# Patient Record
Sex: MALE | Race: WHITE | Employment: FULL TIME | URBAN - METROPOLITAN AREA
[De-identification: names, ages, dates, MRNs, and addresses within clinical notes are randomized per-mention and may not be internally consistent; named-entity substitution may affect disease eponyms.]

---

## 2017-01-04 ENCOUNTER — OFFICE VISIT (OUTPATIENT)
Dept: FAMILY MEDICINE CLINIC | Age: 62
End: 2017-01-04

## 2017-01-04 VITALS
RESPIRATION RATE: 16 BRPM | WEIGHT: 314 LBS | BODY MASS INDEX: 38.22 KG/M2 | TEMPERATURE: 98.1 F | DIASTOLIC BLOOD PRESSURE: 82 MMHG | SYSTOLIC BLOOD PRESSURE: 123 MMHG | HEART RATE: 74 BPM

## 2017-01-04 DIAGNOSIS — Z98.890 S/P HEART VALVE REPAIR: ICD-10-CM

## 2017-01-04 DIAGNOSIS — J06.9 VIRAL UPPER RESPIRATORY TRACT INFECTION: Primary | ICD-10-CM

## 2017-01-04 DIAGNOSIS — E78.2 MIXED HYPERLIPIDEMIA: ICD-10-CM

## 2017-01-04 PROCEDURE — 99214 OFFICE O/P EST MOD 30 MIN: CPT | Performed by: FAMILY MEDICINE

## 2017-01-04 RX ORDER — MAGNESIUM OXIDE 400 MG/1
400 TABLET ORAL
COMMUNITY

## 2017-01-04 RX ORDER — CARVEDILOL 6.25 MG/1
TABLET ORAL 2 TIMES DAILY
COMMUNITY
Start: 2016-12-26 | End: 2017-01-04 | Stop reason: SDUPTHER

## 2017-01-04 RX ORDER — CARVEDILOL 6.25 MG/1
12.5 TABLET ORAL 2 TIMES DAILY
Qty: 60 TABLET | COMMUNITY
Start: 2017-01-04

## 2017-01-04 RX ORDER — GUAIFENESIN AND DEXTROMETHORPHAN HYDROBROMIDE 600; 30 MG/1; MG/1
1 TABLET, EXTENDED RELEASE ORAL 2 TIMES DAILY
Qty: 30 TABLET | Refills: 2 | Status: SHIPPED | OUTPATIENT
Start: 2017-01-04 | End: 2017-04-07 | Stop reason: ALTCHOICE

## 2017-01-04 RX ORDER — WARFARIN SODIUM 7.5 MG/1
7.5 TABLET ORAL
COMMUNITY
End: 2018-01-31 | Stop reason: ALTCHOICE

## 2017-01-04 RX ORDER — ATORVASTATIN CALCIUM 40 MG/1
TABLET, FILM COATED ORAL
COMMUNITY
Start: 2016-12-20 | End: 2017-01-25 | Stop reason: SDUPTHER

## 2017-01-04 RX ORDER — AMIODARONE HYDROCHLORIDE 200 MG/1
TABLET ORAL
COMMUNITY
Start: 2017-01-02 | End: 2018-01-31 | Stop reason: ALTCHOICE

## 2017-01-04 RX ORDER — ASPIRIN 81 MG/1
81 TABLET, CHEWABLE ORAL
COMMUNITY
Start: 2016-11-22 | End: 2017-01-04 | Stop reason: SDUPTHER

## 2017-01-04 RX ORDER — CARVEDILOL 6.25 MG/1
6.25 TABLET ORAL
COMMUNITY
Start: 2016-11-22 | End: 2017-01-04 | Stop reason: SDUPTHER

## 2017-01-04 ASSESSMENT — PATIENT HEALTH QUESTIONNAIRE - PHQ9
1. LITTLE INTEREST OR PLEASURE IN DOING THINGS: 0
SUM OF ALL RESPONSES TO PHQ9 QUESTIONS 1 & 2: 0
2. FEELING DOWN, DEPRESSED OR HOPELESS: 0
SUM OF ALL RESPONSES TO PHQ QUESTIONS 1-9: 0

## 2017-01-04 ASSESSMENT — ENCOUNTER SYMPTOMS
ABDOMINAL DISTENTION: 0
CONSTIPATION: 0
SHORTNESS OF BREATH: 0
DIARRHEA: 0
WHEEZING: 0

## 2017-01-08 ASSESSMENT — ENCOUNTER SYMPTOMS
TROUBLE SWALLOWING: 0
COUGH: 1
PHOTOPHOBIA: 0
SORE THROAT: 0
RHINORRHEA: 1

## 2017-01-25 ENCOUNTER — OFFICE VISIT (OUTPATIENT)
Dept: PULMONOLOGY | Age: 62
End: 2017-01-25

## 2017-01-25 ENCOUNTER — TELEPHONE (OUTPATIENT)
Dept: FAMILY MEDICINE CLINIC | Age: 62
End: 2017-01-25

## 2017-01-25 VITALS
SYSTOLIC BLOOD PRESSURE: 134 MMHG | HEIGHT: 76 IN | BODY MASS INDEX: 37.63 KG/M2 | WEIGHT: 309 LBS | OXYGEN SATURATION: 97 % | HEART RATE: 69 BPM | DIASTOLIC BLOOD PRESSURE: 88 MMHG

## 2017-01-25 DIAGNOSIS — E66.9 NON MORBID OBESITY, UNSPECIFIED OBESITY TYPE: Chronic | ICD-10-CM

## 2017-01-25 DIAGNOSIS — I50.9 CONGESTIVE HEART FAILURE, UNSPECIFIED CONGESTIVE HEART FAILURE CHRONICITY, UNSPECIFIED CONGESTIVE HEART FAILURE TYPE: Chronic | ICD-10-CM

## 2017-01-25 DIAGNOSIS — R06.83 SNORING: ICD-10-CM

## 2017-01-25 DIAGNOSIS — I48.91 ATRIAL FIBRILLATION, UNSPECIFIED TYPE (HCC): Chronic | ICD-10-CM

## 2017-01-25 DIAGNOSIS — G47.10 HYPERSOMNIA: ICD-10-CM

## 2017-01-25 DIAGNOSIS — G47.61 PERIODIC LIMB MOVEMENT DISORDER (PLMD): Primary | ICD-10-CM

## 2017-01-25 PROCEDURE — 99244 OFF/OP CNSLTJ NEW/EST MOD 40: CPT | Performed by: INTERNAL MEDICINE

## 2017-01-25 RX ORDER — ATORVASTATIN CALCIUM 40 MG/1
40 TABLET, FILM COATED ORAL DAILY
Qty: 30 TABLET | Refills: 2 | Status: SHIPPED | OUTPATIENT
Start: 2017-01-25

## 2017-01-25 RX ORDER — ATORVASTATIN CALCIUM 40 MG/1
TABLET, FILM COATED ORAL
Qty: 30 TABLET | OUTPATIENT
Start: 2017-01-25

## 2017-01-25 RX ORDER — AMIODARONE HYDROCHLORIDE 200 MG/1
TABLET ORAL
Qty: 30 TABLET | OUTPATIENT
Start: 2017-01-25

## 2017-01-25 ASSESSMENT — SLEEP AND FATIGUE QUESTIONNAIRES
ESS TOTAL SCORE: 11
HOW LIKELY ARE YOU TO NOD OFF OR FALL ASLEEP WHILE SITTING AND READING: 3
HOW LIKELY ARE YOU TO NOD OFF OR FALL ASLEEP IN A CAR, WHILE STOPPED FOR A FEW MINUTES IN TRAFFIC: 0
HOW LIKELY ARE YOU TO NOD OFF OR FALL ASLEEP WHILE SITTING INACTIVE IN A PUBLIC PLACE: 1
HOW LIKELY ARE YOU TO NOD OFF OR FALL ASLEEP WHILE LYING DOWN TO REST IN THE AFTERNOON WHEN CIRCUMSTANCES PERMIT: 2
HOW LIKELY ARE YOU TO NOD OFF OR FALL ASLEEP WHILE WATCHING TV: 3
HOW LIKELY ARE YOU TO NOD OFF OR FALL ASLEEP WHILE SITTING AND TALKING TO SOMEONE: 0
HOW LIKELY ARE YOU TO NOD OFF OR FALL ASLEEP WHILE SITTING QUIETLY AFTER LUNCH WITHOUT ALCOHOL: 2
HOW LIKELY ARE YOU TO NOD OFF OR FALL ASLEEP WHEN YOU ARE A PASSENGER IN A CAR FOR AN HOUR WITHOUT A BREAK: 0
NECK CIRCUMFERENCE (INCHES): 19

## 2017-01-25 ASSESSMENT — ENCOUNTER SYMPTOMS
SHORTNESS OF BREATH: 0
NAUSEA: 0
ABDOMINAL PAIN: 0
PHOTOPHOBIA: 0
EYE PAIN: 0
STRIDOR: 0
APNEA: 1
ABDOMINAL DISTENTION: 0
SINUS PRESSURE: 0
CHEST TIGHTNESS: 0

## 2017-02-20 ENCOUNTER — TELEPHONE (OUTPATIENT)
Dept: FAMILY MEDICINE CLINIC | Age: 62
End: 2017-02-20

## 2017-02-22 ENCOUNTER — HOSPITAL ENCOUNTER (OUTPATIENT)
Dept: OTHER | Age: 62
Discharge: HOME | End: 2017-02-23
Attending: INTERNAL MEDICINE | Admitting: INTERNAL MEDICINE

## 2017-02-24 ENCOUNTER — OFFICE VISIT (OUTPATIENT)
Dept: FAMILY MEDICINE CLINIC | Age: 62
End: 2017-02-24

## 2017-02-24 VITALS
HEIGHT: 76 IN | BODY MASS INDEX: 37.99 KG/M2 | SYSTOLIC BLOOD PRESSURE: 118 MMHG | HEART RATE: 71 BPM | DIASTOLIC BLOOD PRESSURE: 80 MMHG | WEIGHT: 312 LBS | OXYGEN SATURATION: 95 %
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
## 2017-02-24 DIAGNOSIS — J18.9 HOSPITAL ACQUIRED PNA: Primary | ICD-10-CM

## 2017-02-24 PROCEDURE — 99213 OFFICE O/P EST LOW 20 MIN: CPT | Performed by: NURSE PRACTITIONER

## 2017-02-24 ASSESSMENT — ENCOUNTER SYMPTOMS
HOARSE VOICE: 1
RHINORRHEA: 0
DIFFICULTY BREATHING: 0
NAUSEA: 0
CHEST TIGHTNESS: 0
SHORTNESS OF BREATH: 0
APNEA: 1
WHEEZING: 0
SINUS PRESSURE: 0
SORE THROAT: 0
COLOR CHANGE: 0
STRIDOR: 0
SPUTUM PRODUCTION: 0
HEMOPTYSIS: 0
VOMITING: 0
COUGH: 1

## 2017-03-03 ENCOUNTER — TELEPHONE (OUTPATIENT)
Dept: FAMILY MEDICINE CLINIC | Age: 62
End: 2017-03-03

## 2017-03-03 ENCOUNTER — OFFICE VISIT (OUTPATIENT)
Dept: FAMILY MEDICINE CLINIC | Age: 62
End: 2017-03-03

## 2017-03-03 ENCOUNTER — HOSPITAL ENCOUNTER (OUTPATIENT)
Dept: GENERAL RADIOLOGY | Age: 62
Discharge: HOME | End: 2017-03-03

## 2017-03-03 VITALS
DIASTOLIC BLOOD PRESSURE: 74 MMHG | RESPIRATION RATE: 14 BRPM | WEIGHT: 312 LBS | HEART RATE: 71 BPM | TEMPERATURE: 97.3 F | BODY MASS INDEX: 37.99 KG/M2 | SYSTOLIC BLOOD PRESSURE: 108 MMHG | OXYGEN SATURATION: 94 % | HEIGHT: 76 IN

## 2017-03-03 DIAGNOSIS — Z87.01 HISTORY OF PNEUMONIA: ICD-10-CM

## 2017-03-03 DIAGNOSIS — R07.89 ACUTE CHEST WALL PAIN: Primary | ICD-10-CM

## 2017-03-03 DIAGNOSIS — R05.9 COUGH: ICD-10-CM

## 2017-03-03 DIAGNOSIS — R07.89 ACUTE CHEST WALL PAIN: ICD-10-CM

## 2017-03-03 DIAGNOSIS — Z86.79 HISTORY OF HEART FAILURE: ICD-10-CM

## 2017-03-03 LAB
BASOPHILS ABSOLUTE: 0 K/UL (ref 0–0.2)
BASOPHILS RELATIVE PERCENT: 0.4 %
EOSINOPHILS ABSOLUTE: 0.1 K/UL (ref 0–0.6)
EOSINOPHILS RELATIVE PERCENT: 1.5 %
HCT VFR BLD CALC: 41.7 % (ref 40.5–52.5)
HEMOGLOBIN: 13.6 G/DL (ref 13.5–17.5)
LYMPHOCYTES ABSOLUTE: 1.9 K/UL (ref 1–5.1)
LYMPHOCYTES RELATIVE PERCENT: 25 %
MCH RBC QN AUTO: 29.2 PG (ref 26–34)
MCHC RBC AUTO-ENTMCNC: 32.7 G/DL (ref 31–36)
MCV RBC AUTO: 89.5 FL (ref 80–100)
MONOCYTES ABSOLUTE: 0.6 K/UL (ref 0–1.3)
MONOCYTES RELATIVE PERCENT: 8.2 %
NEUTROPHILS ABSOLUTE: 5 K/UL (ref 1.7–7.7)
NEUTROPHILS RELATIVE PERCENT: 64.9 %
PDW BLD-RTO: 14.3 % (ref 12.4–15.4)
PLATELET # BLD: 227 K/UL (ref 135–450)
PMV BLD AUTO: 9.4 FL (ref 5–10.5)
PRO-BNP: 175 PG/ML (ref 0–124)
RBC # BLD: 4.66 M/UL (ref 4.2–5.9)
WBC # BLD: 7.7 K/UL (ref 4–11)

## 2017-03-03 PROCEDURE — 99213 OFFICE O/P EST LOW 20 MIN: CPT | Performed by: FAMILY MEDICINE

## 2017-03-03 RX ORDER — M-VIT,TX,IRON,MINS/CALC/FOLIC 27MG-0.4MG
1 TABLET ORAL DAILY
COMMUNITY

## 2017-03-03 RX ORDER — ASPIRIN 81 MG/1
81 TABLET, CHEWABLE ORAL DAILY
COMMUNITY
End: 2018-01-31 | Stop reason: ALTCHOICE

## 2017-03-03 RX ORDER — ASCORBIC ACID 500 MG
750 TABLET ORAL DAILY
COMMUNITY

## 2017-03-03 RX ORDER — BENZONATATE 100 MG/1
100 CAPSULE ORAL 3 TIMES DAILY PRN
Qty: 30 CAPSULE | Refills: 1 | Status: SHIPPED | OUTPATIENT
Start: 2017-03-03 | End: 2017-03-13

## 2017-03-03 ASSESSMENT — ENCOUNTER SYMPTOMS
COUGH: 1
SINUS PRESSURE: 0
NAUSEA: 0
ABDOMINAL PAIN: 0
SHORTNESS OF BREATH: 0
RHINORRHEA: 0
CONSTIPATION: 0
SORE THROAT: 0
DIARRHEA: 0
VOMITING: 0

## 2017-03-07 ENCOUNTER — OFFICE VISIT (OUTPATIENT)
Dept: FAMILY MEDICINE CLINIC | Age: 62
End: 2017-03-07

## 2017-03-07 VITALS
HEIGHT: 76 IN | RESPIRATION RATE: 17 BRPM | SYSTOLIC BLOOD PRESSURE: 141 MMHG | DIASTOLIC BLOOD PRESSURE: 83 MMHG | TEMPERATURE: 98.3 F | WEIGHT: 315 LBS | HEART RATE: 63 BPM | OXYGEN SATURATION: 96 % | BODY MASS INDEX: 38.36 KG/M2

## 2017-03-07 DIAGNOSIS — R07.89 ACUTE CHEST WALL PAIN: Primary | ICD-10-CM

## 2017-03-07 DIAGNOSIS — R05.9 COUGH: ICD-10-CM

## 2017-03-07 PROCEDURE — 99213 OFFICE O/P EST LOW 20 MIN: CPT | Performed by: NURSE PRACTITIONER

## 2017-03-07 ASSESSMENT — ENCOUNTER SYMPTOMS
SHORTNESS OF BREATH: 0
WHEEZING: 0
SORE THROAT: 0
EYE REDNESS: 0
CHEST TIGHTNESS: 0
COLOR CHANGE: 0
NAUSEA: 0
EYE ITCHING: 0
COUGH: 1
TROUBLE SWALLOWING: 0
SINUS PRESSURE: 0
ABDOMINAL PAIN: 0
CONSTIPATION: 0
DIARRHEA: 0

## 2017-03-10 ENCOUNTER — TELEPHONE (OUTPATIENT)
Dept: FAMILY MEDICINE CLINIC | Age: 62
End: 2017-03-10

## 2017-03-20 ENCOUNTER — HOSPITAL ENCOUNTER (OUTPATIENT)
Dept: SLEEP MEDICINE | Age: 62
Discharge: HOME | End: 2017-03-22
Attending: INTERNAL MEDICINE | Admitting: INTERNAL MEDICINE

## 2017-03-20 DIAGNOSIS — G47.61 PERIODIC LIMB MOVEMENT DISORDER (PLMD): ICD-10-CM

## 2017-03-20 DIAGNOSIS — R06.83 SNORING: ICD-10-CM

## 2017-03-20 DIAGNOSIS — G47.10 HYPERSOMNIA: ICD-10-CM

## 2017-03-20 PROCEDURE — 95810 POLYSOM 6/> YRS 4/> PARAM: CPT | Performed by: INTERNAL MEDICINE

## 2017-03-26 ENCOUNTER — TELEPHONE (OUTPATIENT)
Dept: FAMILY MEDICINE CLINIC | Age: 62
End: 2017-03-26

## 2017-03-28 ENCOUNTER — TELEPHONE (OUTPATIENT)
Dept: SLEEP MEDICINE | Age: 62
End: 2017-03-28

## 2017-04-07 ENCOUNTER — OFFICE VISIT (OUTPATIENT)
Dept: FAMILY MEDICINE CLINIC | Age: 62
End: 2017-04-07

## 2017-04-07 VITALS
DIASTOLIC BLOOD PRESSURE: 60 MMHG | BODY MASS INDEX: 38.36 KG/M2 | TEMPERATURE: 98.1 F | RESPIRATION RATE: 16 BRPM | SYSTOLIC BLOOD PRESSURE: 90 MMHG | OXYGEN SATURATION: 97 % | HEART RATE: 67 BPM | WEIGHT: 315 LBS | HEIGHT: 76 IN

## 2017-04-07 DIAGNOSIS — Z86.79 HISTORY OF HEART FAILURE: ICD-10-CM

## 2017-04-07 DIAGNOSIS — R05.9 COUGH: Primary | ICD-10-CM

## 2017-04-07 DIAGNOSIS — I95.1 ORTHOSTATIC HYPOTENSION: ICD-10-CM

## 2017-04-07 DIAGNOSIS — I50.9 HEART FAILURE, UNSPECIFIED HEART FAILURE CHRONICITY, UNSPECIFIED HEART FAILURE TYPE: Chronic | ICD-10-CM

## 2017-04-07 LAB — PRO-BNP: 1613 PG/ML (ref 0–124)

## 2017-04-07 PROCEDURE — 99214 OFFICE O/P EST MOD 30 MIN: CPT | Performed by: NURSE PRACTITIONER

## 2017-04-07 RX ORDER — FLUTICASONE PROPIONATE 50 MCG
2 SPRAY, SUSPENSION (ML) NASAL DAILY
Qty: 1 BOTTLE | Refills: 0 | Status: SHIPPED | OUTPATIENT
Start: 2017-04-07 | End: 2018-01-31 | Stop reason: ALTCHOICE

## 2017-04-07 RX ORDER — SPIRONOLACTONE 25 MG/1
1 TABLET ORAL
COMMUNITY

## 2017-04-07 ASSESSMENT — PATIENT HEALTH QUESTIONNAIRE - PHQ9
2. FEELING DOWN, DEPRESSED OR HOPELESS: 0
SUM OF ALL RESPONSES TO PHQ9 QUESTIONS 1 & 2: 0
1. LITTLE INTEREST OR PLEASURE IN DOING THINGS: 0
SUM OF ALL RESPONSES TO PHQ QUESTIONS 1-9: 0

## 2017-04-07 ASSESSMENT — ENCOUNTER SYMPTOMS
WHEEZING: 0
CHEST TIGHTNESS: 0
EYES NEGATIVE: 1
VOMITING: 0
SORE THROAT: 0
SHORTNESS OF BREATH: 1
COLOR CHANGE: 0
CHANGE IN BOWEL HABIT: 0
HEARTBURN: 0
COUGH: 1
HEMOPTYSIS: 0
RHINORRHEA: 0
NAUSEA: 0

## 2017-04-10 ENCOUNTER — TELEPHONE (OUTPATIENT)
Dept: FAMILY MEDICINE CLINIC | Age: 62
End: 2017-04-10

## 2017-04-11 ENCOUNTER — TELEPHONE (OUTPATIENT)
Dept: FAMILY MEDICINE CLINIC | Age: 62
End: 2017-04-11

## 2017-06-14 ENCOUNTER — OFFICE VISIT (OUTPATIENT)
Dept: PULMONOLOGY | Age: 62
End: 2017-06-14

## 2017-06-14 VITALS
BODY MASS INDEX: 36.77 KG/M2 | HEIGHT: 76 IN | OXYGEN SATURATION: 97 % | SYSTOLIC BLOOD PRESSURE: 105 MMHG | HEART RATE: 71 BPM | DIASTOLIC BLOOD PRESSURE: 70 MMHG | WEIGHT: 302 LBS

## 2017-06-14 DIAGNOSIS — I48.91 ATRIAL FIBRILLATION, UNSPECIFIED TYPE (HCC): Chronic | ICD-10-CM

## 2017-06-14 DIAGNOSIS — G47.26 SHIFTING SLEEP-WORK SCHEDULE, AFFECTING SLEEP: ICD-10-CM

## 2017-06-14 DIAGNOSIS — I50.9 CONGESTIVE HEART FAILURE, UNSPECIFIED CONGESTIVE HEART FAILURE CHRONICITY, UNSPECIFIED CONGESTIVE HEART FAILURE TYPE: Chronic | ICD-10-CM

## 2017-06-14 DIAGNOSIS — E66.9 NON MORBID OBESITY, UNSPECIFIED OBESITY TYPE: Chronic | ICD-10-CM

## 2017-06-14 DIAGNOSIS — G47.33 OBSTRUCTIVE SLEEP APNEA SYNDROME: Primary | ICD-10-CM

## 2017-06-14 PROCEDURE — 99214 OFFICE O/P EST MOD 30 MIN: CPT | Performed by: INTERNAL MEDICINE

## 2017-06-14 RX ORDER — FUROSEMIDE 20 MG/1
20 TABLET ORAL 2 TIMES DAILY
COMMUNITY

## 2017-06-14 ASSESSMENT — SLEEP AND FATIGUE QUESTIONNAIRES
HOW LIKELY ARE YOU TO NOD OFF OR FALL ASLEEP IN A CAR, WHILE STOPPED FOR A FEW MINUTES IN TRAFFIC: 0
HOW LIKELY ARE YOU TO NOD OFF OR FALL ASLEEP WHILE SITTING AND TALKING TO SOMEONE: 0
HOW LIKELY ARE YOU TO NOD OFF OR FALL ASLEEP WHILE LYING DOWN TO REST IN THE AFTERNOON WHEN CIRCUMSTANCES PERMIT: 0
HOW LIKELY ARE YOU TO NOD OFF OR FALL ASLEEP WHILE WATCHING TV: 1
ESS TOTAL SCORE: 2
HOW LIKELY ARE YOU TO NOD OFF OR FALL ASLEEP WHILE SITTING INACTIVE IN A PUBLIC PLACE: 0
HOW LIKELY ARE YOU TO NOD OFF OR FALL ASLEEP WHILE SITTING AND READING: 1
HOW LIKELY ARE YOU TO NOD OFF OR FALL ASLEEP WHILE SITTING QUIETLY AFTER LUNCH WITHOUT ALCOHOL: 0
HOW LIKELY ARE YOU TO NOD OFF OR FALL ASLEEP WHEN YOU ARE A PASSENGER IN A CAR FOR AN HOUR WITHOUT A BREAK: 0

## 2017-06-14 ASSESSMENT — ENCOUNTER SYMPTOMS
CHEST TIGHTNESS: 0
EYE PAIN: 0
SINUS PRESSURE: 0
SHORTNESS OF BREATH: 0
STRIDOR: 0
PHOTOPHOBIA: 0

## 2017-10-11 ENCOUNTER — OFFICE VISIT (OUTPATIENT)
Dept: PULMONOLOGY | Age: 62
End: 2017-10-11

## 2017-10-11 VITALS
OXYGEN SATURATION: 97 % | HEIGHT: 76 IN | DIASTOLIC BLOOD PRESSURE: 86 MMHG | BODY MASS INDEX: 38.36 KG/M2 | SYSTOLIC BLOOD PRESSURE: 136 MMHG | HEART RATE: 72 BPM | WEIGHT: 315 LBS

## 2017-10-11 DIAGNOSIS — I48.91 ATRIAL FIBRILLATION, UNSPECIFIED TYPE (HCC): Chronic | ICD-10-CM

## 2017-10-11 DIAGNOSIS — E66.9 NON MORBID OBESITY, UNSPECIFIED OBESITY TYPE: Chronic | ICD-10-CM

## 2017-10-11 DIAGNOSIS — I50.9 CONGESTIVE HEART FAILURE, UNSPECIFIED CONGESTIVE HEART FAILURE CHRONICITY, UNSPECIFIED CONGESTIVE HEART FAILURE TYPE: Chronic | ICD-10-CM

## 2017-10-11 DIAGNOSIS — G47.33 OBSTRUCTIVE SLEEP APNEA SYNDROME: Primary | Chronic | ICD-10-CM

## 2017-10-11 DIAGNOSIS — G47.26 SHIFTING SLEEP-WORK SCHEDULE, AFFECTING SLEEP: Chronic | ICD-10-CM

## 2017-10-11 PROCEDURE — 99214 OFFICE O/P EST MOD 30 MIN: CPT | Performed by: NURSE PRACTITIONER

## 2017-10-11 ASSESSMENT — SLEEP AND FATIGUE QUESTIONNAIRES
HOW LIKELY ARE YOU TO NOD OFF OR FALL ASLEEP WHILE SITTING AND TALKING TO SOMEONE: 0
HOW LIKELY ARE YOU TO NOD OFF OR FALL ASLEEP WHILE SITTING QUIETLY AFTER LUNCH WITHOUT ALCOHOL: 1
HOW LIKELY ARE YOU TO NOD OFF OR FALL ASLEEP WHILE SITTING INACTIVE IN A PUBLIC PLACE: 0
HOW LIKELY ARE YOU TO NOD OFF OR FALL ASLEEP WHILE LYING DOWN TO REST IN THE AFTERNOON WHEN CIRCUMSTANCES PERMIT: 1
ESS TOTAL SCORE: 5
HOW LIKELY ARE YOU TO NOD OFF OR FALL ASLEEP IN A CAR, WHILE STOPPED FOR A FEW MINUTES IN TRAFFIC: 0
HOW LIKELY ARE YOU TO NOD OFF OR FALL ASLEEP WHEN YOU ARE A PASSENGER IN A CAR FOR AN HOUR WITHOUT A BREAK: 0
HOW LIKELY ARE YOU TO NOD OFF OR FALL ASLEEP WHILE SITTING AND READING: 2
HOW LIKELY ARE YOU TO NOD OFF OR FALL ASLEEP WHILE WATCHING TV: 1

## 2017-10-11 ASSESSMENT — ENCOUNTER SYMPTOMS
APNEA: 0
ABDOMINAL PAIN: 0
SINUS PRESSURE: 0
SHORTNESS OF BREATH: 0
ABDOMINAL DISTENTION: 0
COUGH: 0
RHINORRHEA: 0

## 2018-01-31 ENCOUNTER — OFFICE VISIT (OUTPATIENT)
Dept: FAMILY MEDICINE CLINIC | Age: 63
End: 2018-01-31

## 2018-01-31 VITALS
WEIGHT: 314 LBS | HEART RATE: 85 BPM | OXYGEN SATURATION: 95 % | BODY MASS INDEX: 38.24 KG/M2 | SYSTOLIC BLOOD PRESSURE: 98 MMHG | HEIGHT: 76 IN | DIASTOLIC BLOOD PRESSURE: 76 MMHG

## 2018-01-31 DIAGNOSIS — Z12.11 COLON CANCER SCREENING: ICD-10-CM

## 2018-01-31 DIAGNOSIS — Z01.818 PRE-OP EXAM: ICD-10-CM

## 2018-01-31 DIAGNOSIS — H25.019 CORTICAL AGE-RELATED CATARACT, UNSPECIFIED LATERALITY: Primary | ICD-10-CM

## 2018-01-31 PROCEDURE — 99214 OFFICE O/P EST MOD 30 MIN: CPT | Performed by: FAMILY MEDICINE

## 2018-01-31 ASSESSMENT — ENCOUNTER SYMPTOMS
GASTROINTESTINAL NEGATIVE: 1
SHORTNESS OF BREATH: 0
COUGH: 0

## 2018-02-03 ASSESSMENT — ENCOUNTER SYMPTOMS
WHEEZING: 0
PHOTOPHOBIA: 0

## 2018-02-19 DIAGNOSIS — Z12.11 SCREENING FOR COLON CANCER: Primary | ICD-10-CM

## 2018-10-10 ENCOUNTER — OFFICE VISIT (OUTPATIENT)
Dept: PULMONOLOGY | Age: 63
End: 2018-10-10
Payer: COMMERCIAL

## 2018-10-10 VITALS
HEART RATE: 78 BPM | HEIGHT: 76 IN | SYSTOLIC BLOOD PRESSURE: 138 MMHG | BODY MASS INDEX: 38.36 KG/M2 | DIASTOLIC BLOOD PRESSURE: 80 MMHG | OXYGEN SATURATION: 98 % | WEIGHT: 315 LBS

## 2018-10-10 DIAGNOSIS — E66.01 CLASS 3 SEVERE OBESITY WITH SERIOUS COMORBIDITY AND BODY MASS INDEX (BMI) OF 40.0 TO 44.9 IN ADULT, UNSPECIFIED OBESITY TYPE (HCC): Chronic | ICD-10-CM

## 2018-10-10 DIAGNOSIS — I48.91 ATRIAL FIBRILLATION, UNSPECIFIED TYPE (HCC): Chronic | ICD-10-CM

## 2018-10-10 DIAGNOSIS — I50.9 HEART FAILURE, UNSPECIFIED HF CHRONICITY, UNSPECIFIED HEART FAILURE TYPE (HCC): Chronic | ICD-10-CM

## 2018-10-10 DIAGNOSIS — G47.33 OBSTRUCTIVE SLEEP APNEA: Primary | Chronic | ICD-10-CM

## 2018-10-10 DIAGNOSIS — G47.26 SHIFTING SLEEP-WORK SCHEDULE, AFFECTING SLEEP: Chronic | ICD-10-CM

## 2018-10-10 PROCEDURE — 99214 OFFICE O/P EST MOD 30 MIN: CPT | Performed by: NURSE PRACTITIONER

## 2018-10-10 ASSESSMENT — SLEEP AND FATIGUE QUESTIONNAIRES
HOW LIKELY ARE YOU TO NOD OFF OR FALL ASLEEP WHILE SITTING QUIETLY AFTER LUNCH WITHOUT ALCOHOL: 0
ESS TOTAL SCORE: 2
HOW LIKELY ARE YOU TO NOD OFF OR FALL ASLEEP IN A CAR, WHILE STOPPED FOR A FEW MINUTES IN TRAFFIC: 0
HOW LIKELY ARE YOU TO NOD OFF OR FALL ASLEEP WHILE SITTING AND TALKING TO SOMEONE: 0
HOW LIKELY ARE YOU TO NOD OFF OR FALL ASLEEP WHILE LYING DOWN TO REST IN THE AFTERNOON WHEN CIRCUMSTANCES PERMIT: 0
HOW LIKELY ARE YOU TO NOD OFF OR FALL ASLEEP WHEN YOU ARE A PASSENGER IN A CAR FOR AN HOUR WITHOUT A BREAK: 0
HOW LIKELY ARE YOU TO NOD OFF OR FALL ASLEEP WHILE SITTING AND READING: 1
HOW LIKELY ARE YOU TO NOD OFF OR FALL ASLEEP WHILE WATCHING TV: 1
HOW LIKELY ARE YOU TO NOD OFF OR FALL ASLEEP WHILE SITTING INACTIVE IN A PUBLIC PLACE: 0

## 2018-10-10 ASSESSMENT — ENCOUNTER SYMPTOMS
COUGH: 0
SINUS PRESSURE: 0
RHINORRHEA: 0
APNEA: 0
ABDOMINAL DISTENTION: 0
SHORTNESS OF BREATH: 0
ABDOMINAL PAIN: 0

## 2018-10-10 NOTE — PROGRESS NOTES
Cristine Maciel MD, FAASM, [facility]  Mark Trujillo, MSN, RN, CNP     [address] [facility] SLEEP MEDICINE  [facility]   [facility]  [address]  [facility] [place] [id number]  Dept: [phone]  Dept Fax: : [address] SLEEP MEDICINE  [address] [place] [id number] [phone]    Subjective:     Patient ID: Rich Ramesh is a 57 y.o. male. Chief Complaint   Patient presents with    Sleep Apnea       HPI:      Machine Present today:  Yes    Machine Modem/Download Info:  Compliance (hours/night): 5.5 hrs/night  Download AHI (/hour): 2.9 /HR  Average CPAP Pressure : 10.8 cmH2O           APAP - Settings  Pressure Min: 8 cmH2O  Pressure Max: 18 cmH2O                 Comfort Settings  Humidity Level (0-8): 3  Heated Tubing (Yes/No): Yes  Flex/EPR (0-3): 3 PAP Mask  Mask Type: Nasal mask     [place] - Total score: 2    Follow-up :     Last Visit : October 2017      Patient reports the listed Co-morbidities are well controlled and stable at this time. Subjective Health Changes: None     Follow-up :      Over Night Oximetry: [] Yes  [] No  [x] NA   Using O2: [] Yes  [] No  [x] NA   Patient is compliant with the machine  [x] Yes  [] No   Feeling rested when using the machine   [x] Yes  [] No     Pressure is comfortable with inspiration and expiration  [x] Yes  [] No     Noticed changes in pressure   [] Yes  [] No  [x] NA   Mask is fitting well  [x] Yes  [] No   Noting Mask Air Leak  [x] Yes  [] No   Having painful Aerophagia  [] Yes  [x] No   Nocturia   0-1  per night.    Having  HA upon waking  [] Yes  [x] No   Dry mouth upon waking   [x] Yes  [] No   Congestion upon waking   [] Yes  [x] No    Nose Bleeds  [] Yes  [x] No   Using Sleep Aides    [x] NA   Understands how to change humidification and/or tubing temperature for comfort while at home  [x] Yes  [] No     Difficulties falling asleep  [] Yes  [x] No Other Topics Concern    Not on file     Social History Narrative    No narrative on file       Prior to Admission medications    Medication Sig Start Date End Date Taking? Authorizing Provider   furosemide (LASIX) 20 MG tablet Take 20 mg by mouth 2 times daily   Yes Historical Provider, MD   spironolactone (ALDACTONE) 25 MG tablet Take 1 tablet by mouth   Yes Historical Provider, MD   Multiple Vitamins-Minerals (THERAPEUTIC MULTIVITAMIN-MINERALS) tablet Take 1 tablet by mouth daily   Yes Historical Provider, MD   Ascorbic Acid (VITAMIN C) 500 MG tablet Take 750 mg by mouth daily   Yes Historical Provider, MD   atorvastatin (LIPITOR) 40 MG tablet Take 1 tablet by mouth daily 1/25/17  Yes Mann Rosenberg APRN - CNP   magnesium oxide (MAG-OX) 400 MG tablet Take 400 mg by mouth   Yes Historical Provider, MD   carvedilol (COREG) 6.25 MG tablet Take 12.5 mg by mouth 2 times daily  1/4/17  Yes Bernadette Magana,        Allergies as of 10/10/2018 - Review Complete 10/10/2018   Allergen Reaction Noted    Propofol  04/13/2017       Patient Active Problem List   Diagnosis    Heart failure ([address]    Atrial fibrillation ([address]    History of heart valve repair    Obstructive sleep apnea    Shifting sleep-work schedule, affecting sleep       Past Medical History:   Diagnosis Date    Atrial fibrillation ([address]     CHF (congestive heart failure) ([address]     Obstructive sleep apnea (adult) (pediatric)     on CPAP    Shifting sleep-work schedule, affecting sleep 6/14/2017       Past Surgical History:   Procedure Laterality Date    MITRAL VALVULOPLASTY  11/2016       Family History   Problem Relation Age of Onset    Other Father         JOY       Vitals:  Weight BMI   Wt Readings from Last 3 Encounters:   10/10/18 (!) 331 lb 9.6 oz (150.4 kg)   01/31/18 (!) 314 lb (142.4 kg)   10/11/17 (!) 321 lb (145.6 kg)    Body mass index is 40.36 kg/m².      BP HR SaO2   BP Readings from Last 3 Encounters:   10/10/18 138/80

## 2018-12-10 ENCOUNTER — OFFICE VISIT (OUTPATIENT)
Dept: FAMILY MEDICINE CLINIC | Age: 63
End: 2018-12-10
Payer: COMMERCIAL

## 2018-12-10 VITALS
HEIGHT: 76 IN | HEART RATE: 78 BPM | DIASTOLIC BLOOD PRESSURE: 84 MMHG | WEIGHT: 315 LBS | SYSTOLIC BLOOD PRESSURE: 126 MMHG | BODY MASS INDEX: 38.36 KG/M2 | OXYGEN SATURATION: 99 %

## 2018-12-10 DIAGNOSIS — Z23 NEED FOR PNEUMOCOCCAL VACCINE: ICD-10-CM

## 2018-12-10 DIAGNOSIS — L98.9 SUPERFICIAL SKIN LESION: Primary | ICD-10-CM

## 2018-12-10 DIAGNOSIS — Z23 NEEDS FLU SHOT: ICD-10-CM

## 2018-12-10 PROCEDURE — 90471 IMMUNIZATION ADMIN: CPT | Performed by: FAMILY MEDICINE

## 2018-12-10 PROCEDURE — 90670 PCV13 VACCINE IM: CPT | Performed by: FAMILY MEDICINE

## 2018-12-10 PROCEDURE — 90662 IIV NO PRSV INCREASED AG IM: CPT | Performed by: FAMILY MEDICINE

## 2018-12-10 PROCEDURE — 99213 OFFICE O/P EST LOW 20 MIN: CPT | Performed by: FAMILY MEDICINE

## 2018-12-10 PROCEDURE — 90472 IMMUNIZATION ADMIN EACH ADD: CPT | Performed by: FAMILY MEDICINE

## 2018-12-10 RX ORDER — TRIAMCINOLONE ACETONIDE 1 MG/G
CREAM TOPICAL
Qty: 45 G | Refills: 1 | Status: SHIPPED | OUTPATIENT
Start: 2018-12-10 | End: 2020-12-09

## 2018-12-10 ASSESSMENT — PATIENT HEALTH QUESTIONNAIRE - PHQ9
SUM OF ALL RESPONSES TO PHQ QUESTIONS 1-9: 0
2. FEELING DOWN, DEPRESSED OR HOPELESS: 0
SUM OF ALL RESPONSES TO PHQ QUESTIONS 1-9: 0
SUM OF ALL RESPONSES TO PHQ9 QUESTIONS 1 & 2: 0
1. LITTLE INTEREST OR PLEASURE IN DOING THINGS: 0

## 2018-12-10 ASSESSMENT — ENCOUNTER SYMPTOMS
RHINORRHEA: 1
COUGH: 0
SHORTNESS OF BREATH: 0

## 2019-10-16 ENCOUNTER — OFFICE VISIT (OUTPATIENT)
Dept: PULMONOLOGY | Age: 64
End: 2019-10-16
Payer: COMMERCIAL

## 2019-10-16 VITALS
SYSTOLIC BLOOD PRESSURE: 138 MMHG | HEIGHT: 76 IN | WEIGHT: 315 LBS | HEART RATE: 70 BPM | DIASTOLIC BLOOD PRESSURE: 80 MMHG | BODY MASS INDEX: 38.36 KG/M2 | OXYGEN SATURATION: 98 %

## 2019-10-16 DIAGNOSIS — I48.91 ATRIAL FIBRILLATION, UNSPECIFIED TYPE (HCC): Chronic | ICD-10-CM

## 2019-10-16 DIAGNOSIS — G47.26 SHIFTING SLEEP-WORK SCHEDULE, AFFECTING SLEEP: Chronic | ICD-10-CM

## 2019-10-16 DIAGNOSIS — G47.33 OBSTRUCTIVE SLEEP APNEA: Primary | Chronic | ICD-10-CM

## 2019-10-16 DIAGNOSIS — E66.01 CLASS 3 SEVERE OBESITY WITHOUT SERIOUS COMORBIDITY WITH BODY MASS INDEX (BMI) OF 40.0 TO 44.9 IN ADULT, UNSPECIFIED OBESITY TYPE (HCC): ICD-10-CM

## 2019-10-16 PROCEDURE — 99214 OFFICE O/P EST MOD 30 MIN: CPT | Performed by: NURSE PRACTITIONER

## 2019-10-16 ASSESSMENT — ENCOUNTER SYMPTOMS
SINUS PRESSURE: 0
RHINORRHEA: 0
ABDOMINAL PAIN: 0
ABDOMINAL DISTENTION: 0
COUGH: 0
SHORTNESS OF BREATH: 0
EYE REDNESS: 0
EYE PAIN: 0
APNEA: 0

## 2019-10-16 ASSESSMENT — SLEEP AND FATIGUE QUESTIONNAIRES
HOW LIKELY ARE YOU TO NOD OFF OR FALL ASLEEP WHILE SITTING INACTIVE IN A PUBLIC PLACE: 0
HOW LIKELY ARE YOU TO NOD OFF OR FALL ASLEEP WHILE LYING DOWN TO REST IN THE AFTERNOON WHEN CIRCUMSTANCES PERMIT: 0
HOW LIKELY ARE YOU TO NOD OFF OR FALL ASLEEP WHILE WATCHING TV: 2
HOW LIKELY ARE YOU TO NOD OFF OR FALL ASLEEP WHILE SITTING AND TALKING TO SOMEONE: 0
ESS TOTAL SCORE: 3
HOW LIKELY ARE YOU TO NOD OFF OR FALL ASLEEP WHILE SITTING QUIETLY AFTER LUNCH WITHOUT ALCOHOL: 0
HOW LIKELY ARE YOU TO NOD OFF OR FALL ASLEEP WHILE SITTING AND READING: 1
HOW LIKELY ARE YOU TO NOD OFF OR FALL ASLEEP WHEN YOU ARE A PASSENGER IN A CAR FOR AN HOUR WITHOUT A BREAK: 0
HOW LIKELY ARE YOU TO NOD OFF OR FALL ASLEEP IN A CAR, WHILE STOPPED FOR A FEW MINUTES IN TRAFFIC: 0

## 2020-04-10 ENCOUNTER — TELEPHONE (OUTPATIENT)
Dept: FAMILY MEDICINE CLINIC | Age: 65
End: 2020-04-10

## 2020-04-10 NOTE — TELEPHONE ENCOUNTER
Patient's wife Gregorio Baker called to say pt has a hacking cough but no other symptoms. Offered NT she refused. She asked about getting tested for viirus, I advised pt needs order from PCP.

## 2020-10-28 ENCOUNTER — VIRTUAL VISIT (OUTPATIENT)
Dept: PULMONOLOGY | Age: 65
End: 2020-10-28
Payer: COMMERCIAL

## 2020-10-28 PROCEDURE — 99214 OFFICE O/P EST MOD 30 MIN: CPT | Performed by: NURSE PRACTITIONER

## 2020-10-28 ASSESSMENT — SLEEP AND FATIGUE QUESTIONNAIRES
HOW LIKELY ARE YOU TO NOD OFF OR FALL ASLEEP WHILE WATCHING TV: 1
HOW LIKELY ARE YOU TO NOD OFF OR FALL ASLEEP WHILE SITTING INACTIVE IN A PUBLIC PLACE: 0
HOW LIKELY ARE YOU TO NOD OFF OR FALL ASLEEP WHILE SITTING QUIETLY AFTER LUNCH WITHOUT ALCOHOL: 0
HOW LIKELY ARE YOU TO NOD OFF OR FALL ASLEEP WHEN YOU ARE A PASSENGER IN A CAR FOR AN HOUR WITHOUT A BREAK: 0
HOW LIKELY ARE YOU TO NOD OFF OR FALL ASLEEP WHILE SITTING AND READING: 1
ESS TOTAL SCORE: 2
HOW LIKELY ARE YOU TO NOD OFF OR FALL ASLEEP IN A CAR, WHILE STOPPED FOR A FEW MINUTES IN TRAFFIC: 0
HOW LIKELY ARE YOU TO NOD OFF OR FALL ASLEEP WHILE SITTING AND TALKING TO SOMEONE: 0
HOW LIKELY ARE YOU TO NOD OFF OR FALL ASLEEP WHILE LYING DOWN TO REST IN THE AFTERNOON WHEN CIRCUMSTANCES PERMIT: 0

## 2020-10-28 NOTE — PROGRESS NOTES
Julissa Monahan MD, FAASM, [facility]  Raj Bell, MSN, RN, CNP     [address] [facility] SLEEP MEDICINE  [place] [address] [address] [facility] [address]  Dept: [phone]  Dept Fax: [phone]: [facility] [address] SLEEP MEDICINE  [address] [place] [id number] [phone]    Subjective:     Patient ID: Trisha Tellez is a 75 y.o. male. Chief Complaint   Patient presents with    Sleep Apnea       HPI:      Sleep Medicine Video Visit    Pursuant to the emergency declaration under the [facility], [facility] waiver authority and the [employer] Act this Telephone Visit was insisted, with patient's consent, to reduce the patient's risk of exposure to COVID-19 and provide continuity of care for an established patient. Services were provided through a synchronous discussion over a telephone and/or Video chat to substitute for in-person clinic visit, and coded as such. While patient is at home. Machine Modem/Download Info:  Compliance (hours/night): 6 hrs/night  Download AHI (/hour): 5.2 /HR  Average CPAP Pressure : 11.1 cmH2O           APAP - Settings  Pressure Min: 8 cmH2O  Pressure Max: 18 cmH2O                 Comfort Settings  Humidity Level (0-8): 3  Flex/EPR (0-3): 3 PAP Mask  Mask Type: Nasal mask     [place] - Total score: 2    Follow-up :     Last Visit : October 2019      Patient reports the listed chronic Co-morbidities: CHF, Afib, Obesity, Shift work Disorder    are well controlled and stable at this time.      Subjective Health Changes: None      Over Night Oximetry: [] Yes  [] No  [x] NA [] WNL   Using O2: [] Yes  [] No  [x] NA   Patient is compliant with the machine  [x] Yes  [] No   Feeling rested when using the machine   [x] Yes  [] No     Pressure is comfortable with inspiration and expiration  [x] Yes  [] No Noticed changes in pressure   [] Yes  [] No  [x] NA   Mask is fitting well  [x] Yes  [] No   Noting Mask Air Leak  [x] Yes  [] No   Having painful Aerophagia  [] Yes  [x] No   Nocturia   1  per night. Having  HA upon waking  [] Yes  [x] No   Dry mouth upon waking   Dry Nose  Dry Eyes  [] Yes  [x] No   Congestion upon waking   [] Yes  [x] No    Nose Bleeds  [] Yes  [x] No   Using Sleep Aides    [x] NA   Understands how to change humidification and/or tubing temperature for comfort while at home  [x] Yes  [] No     Difficulties falling asleep  [] Yes  [x] No   Difficulties staying asleep  [] Yes  [x] No   Approximate time to bed  N: 9am D: 10pm   Approximate wake time  N: 12pm D: 5am   Taking Naps  frequent   If taking naps usual length  120 minutes   If taking naps using the machine  [x] Yes  [] No  [] NA [] With and With out    Drowsy when driving  [] Yes  [x] No     Does patient carry a DOT/CDL  [] Yes  [x] No     Does patient carry FAA/[employer] License   [] Yes  [x] No      Any concerns noted with the machine at this time  [] Yes  [x] No        Diagnosis Orders   1. Obstructive sleep apnea     2. Shifting sleep-work schedule, affecting sleep     3. Heart failure, unspecified HF chronicity, unspecified heart failure type ([facility]     4. Atrial fibrillation, unspecified type (HCC)     5. Class 3 severe obesity without serious comorbidity with body mass index (BMI) of 40.0 to 44.9 in adult, unspecified obesity type [facility])         The chronic medical conditions listed are directly related to the primary diagnosis listed above. The management of the primary diagnosis affects the secondary diagnosis and vice versa. Assessment/Plan:     Shifting sleep-work schedule, affecting sleep  Chronic- Stable. Cont meds per PCP and other physicians. Heart failure (HCC)  Chronic- Stable. Cont meds per PCP and other physicians. Atrial fibrillation (HCC)  Chronic- Stable.   Cont meds per PCP and other physicians. Class 3 severe obesity without serious comorbidity with body mass index (BMI) of 40.0 to 44.9 in adult ([facility])  Chronic-Stable. Encouraged him to work on weight loss through diet and exercise. Obstructive sleep apnea  Reviewed compliance download with pt. Supplies and parts as needed for his machine. These are medically necessary. Continue medications per his PCP and other physicians. Limit caffeine use after 3pm.  Encouraged him to work on weight loss through diet and exercise. Diagnoses of Shifting sleep-work schedule, affecting sleep, Heart failure, unspecified HF chronicity, unspecified heart failure type ([address] Atrial fibrillation, unspecified type ([address] and Class 3 severe obesity without serious comorbidity with body mass index (BMI) of 40.0 to 44.9 in adult, unspecified obesity type [facility]) were pertinent to this visit. The chronic medical conditions listed are directly related to the primary diagnosis listed above. The management of the primary diagnosis affects the secondary diagnosis and vice versa. The primary encounter diagnosis was Obstructive sleep apnea. Diagnoses of Shifting sleep-work schedule, affecting sleep, Heart failure, unspecified HF chronicity, unspecified heart failure type ([address] Atrial fibrillation, unspecified type ([address] and Class 3 severe obesity without serious comorbidity with body mass index (BMI) of 40.0 to 44.9 in adult, unspecified obesity type [facility]) were also pertinent to this visit. The chronic medical conditions listed are directly related to the primary diagnosis listed above. The management of the primary diagnosis affects the secondary diagnosis and vice versa.      - Educated patient and reviewed compliance download with pt.    -Supplies and parts as needed for his machine, these are medically necessary.    - Patient using [place] for supplies  -Continue medications per his PCP and other physicians.   -Limit caffeine use after 3pm. -Encouraged him to work on weight loss through diet and exercise. -  Patient able to access video feed. Visit completed via video chat communications. 25 min spent with patient.   - Education on leaking with the new mask   -F/U: 12 month. No orders of the defined types were placed in this encounter. No orders of the defined types were placed in this encounter. No orders of the defined types were placed in this encounter.       Sonu Zepeda, MSN, RN, CNP

## 2020-10-28 NOTE — PROGRESS NOTES
[place]         : 1955    Diagnosis: [x] JOY (G47.33) [] CSA (G47.31) [] Apnea (G47.30)   Length of Need: [x] 12 Months [] 99 Months [] Other:    Machine (GERTRUDIS!): [x] Respironics Dream Station      Auto [] ResMed AirSense     Auto [] Other:     []  CPAP () [] Bilevel ()   Mode: [] Auto [] Spontaneous    Mode: [] Auto [] Spontaneous                            Comfort Settings:   - Ramp Pressure:  cmH2O                                        - Ramp time: 15 min                                     -  Flex/EPR - 3 full time                                    - For ResMed Bilevel (TiMax-4 sec   TiMin- 0.2 sec)     Humidifier: [x] Heated ()        [x] Water chamber replacement ()/ 1 per 6 months        Mask:   [x] Nasal () /1 per 3 months [] Full Face () /1 per 3 months   [x] Patient choice -Size and fit mask [] Patient Choice - Size and fit mask   [] Dispense:  [] Dispense:    [x] Headgear () / 1 per 3 months [] Headgear () / 1 per 3 months   [x] Replacement Nasal Cushion ()/2 per month [] Interface Replacement ()/1 per month   [] Replacement Nasal Pillows ()/2 per month         Tubing: [x] Heated ()/1 per 3 months    [] Standard ()/1 per 3 months [] Other:           Filters: [x] Non-disposable ()/1 per 6 months     [x] Ultra-Fine, Disposable ()/2 per month        Miscellaneous: [] Chin Strap ()/ 1 per 6 months [] O2 bleed-in:       LPM   [] Oximetry on CPAP/Bilevel []  Other:    [x] Modem: ()         Start Order Date: 10/28/20    MEDICAL JUSTIFICATION:  I, the undersigned, certify that the above prescribed supplies are medically necessary for this patients wellbeing. In my opinion, the supplies are both reasonable and necessary in reference to accepted standards of medicalpractice in treatment of this patients condition.     EL Ward - KETAN      NPI: [id number]       Order Signed Date: 10/28/20    Electronically signed by EL Link CNP on 10/28/2020 at 9:00 AM

## 2020-12-07 ENCOUNTER — VIRTUAL VISIT (OUTPATIENT)
Dept: FAMILY MEDICINE CLINIC | Age: 65
End: 2020-12-07
Payer: COMMERCIAL

## 2020-12-07 DIAGNOSIS — B34.9 VIREMIA: ICD-10-CM

## 2020-12-07 DIAGNOSIS — I10 ESSENTIAL HYPERTENSION: ICD-10-CM

## 2020-12-07 PROBLEM — I31.39 PERICARDIAL EFFUSION: Status: ACTIVE | Noted: 2017-04-12

## 2020-12-07 PROBLEM — E78.5 HLD (HYPERLIPIDEMIA): Status: ACTIVE | Noted: 2017-04-12

## 2020-12-07 PROBLEM — J15.9 BACTERIAL PNEUMONIA: Status: ACTIVE | Noted: 2017-02-20

## 2020-12-07 LAB
BASOPHILS ABSOLUTE: 0 K/UL (ref 0–0.2)
BASOPHILS RELATIVE PERCENT: 0.7 %
EOSINOPHILS ABSOLUTE: 0 K/UL (ref 0–0.6)
EOSINOPHILS RELATIVE PERCENT: 0.2 %
HCT VFR BLD CALC: 47 % (ref 40.5–52.5)
HEMOGLOBIN: 15.4 G/DL (ref 13.5–17.5)
LYMPHOCYTES ABSOLUTE: 1.7 K/UL (ref 1–5.1)
LYMPHOCYTES RELATIVE PERCENT: 33.2 %
MCH RBC QN AUTO: 29.7 PG (ref 26–34)
MCHC RBC AUTO-ENTMCNC: 32.8 G/DL (ref 31–36)
MCV RBC AUTO: 90.6 FL (ref 80–100)
MONOCYTES ABSOLUTE: 0.7 K/UL (ref 0–1.3)
MONOCYTES RELATIVE PERCENT: 12.6 %
NEUTROPHILS ABSOLUTE: 2.8 K/UL (ref 1.7–7.7)
NEUTROPHILS RELATIVE PERCENT: 53.3 %
PDW BLD-RTO: 13.3 % (ref 12.4–15.4)
PLATELET # BLD: 162 K/UL (ref 135–450)
PMV BLD AUTO: 10.6 FL (ref 5–10.5)
RBC # BLD: 5.18 M/UL (ref 4.2–5.9)
WBC # BLD: 5.2 K/UL (ref 4–11)

## 2020-12-07 PROCEDURE — 99213 OFFICE O/P EST LOW 20 MIN: CPT | Performed by: FAMILY MEDICINE

## 2020-12-07 ASSESSMENT — ENCOUNTER SYMPTOMS
DIARRHEA: 0
SORE THROAT: 0
TROUBLE SWALLOWING: 0
WHEEZING: 0
COUGH: 1
SHORTNESS OF BREATH: 0

## 2020-12-07 NOTE — PROGRESS NOTES
2020    TELEHEALTH EVALUATION -- Audio/Visual (During GTRZS-41 public health emergency)    HPI:    Jessie Hughes (:  1955) has requested an audio/video evaluation for the following concern(s):    Flu like sx. Chills / low fever 1-2 days. Tired. + cough  1 week ago had neg covid test.  Had been going into work until last night      Review of Systems   Constitutional: Positive for chills and fatigue. HENT: Positive for congestion. Negative for sore throat and trouble swallowing. Sense of taste is ok   Respiratory: Positive for cough. Negative for shortness of breath and wheezing. Cardiovascular:        Has been OK heart wise   Gastrointestinal: Negative for diarrhea (one day). Neurological: Negative for seizures, numbness and headaches. Prior to Visit Medications    Medication Sig Taking? Authorizing Provider   furosemide (LASIX) 20 MG tablet Take 20 mg by mouth 2 times daily Yes Historical Provider, MD   spironolactone (ALDACTONE) 25 MG tablet Take 1 tablet by mouth Yes Historical Provider, MD   Multiple Vitamins-Minerals (THERAPEUTIC MULTIVITAMIN-MINERALS) tablet Take 1 tablet by mouth daily Yes Historical Provider, MD   Ascorbic Acid (VITAMIN C) 500 MG tablet Take 750 mg by mouth daily Yes Historical Provider, MD   atorvastatin (LIPITOR) 40 MG tablet Take 1 tablet by mouth daily Yes EL Oneil CNP   magnesium oxide (MAG-OX) 400 MG tablet Take 400 mg by mouth Yes Historical Provider, MD   carvedilol (COREG) 6.25 MG tablet Take 12.5 mg by mouth 2 times daily  Yes Brunilda Louise DO   triamcinolone (KENALOG) 0.1 % cream Apply topically 2 times daily. Patient not taking: Reported on 2020  Brunilda Louise DO       Social History     Tobacco Use    Smoking status: Never Smoker    Smokeless tobacco: Never Used   Substance Use Topics    Alcohol use: No     Alcohol/week: 0.0 standard drinks     Comment: 1-2 beers per week. no longer drinking     Drug use:  No PHYSICAL EXAMINATION:  [ INSTRUCTIONS:  \"[x]\" Indicates a positive item  \"[]\" Indicates a negative item  -- DELETE ALL ITEMS NOT EXAMINED]  Vital Signs: (As obtained by patient/caregiver or practitioner observation)    Blood pressure-  Heart rate-    Respiratory rate-    Temperature-  Pulse oximetry-     Constitutional: [x] Appears well-developed and well-nourished [x] No apparent distress      [] Abnormal-   Mental status  [x] Alert and awake  [x] Oriented to person/place/time [x]Able to follow commands      Eyes:  EOM    [x]  Normal  [] Abnormal-  Sclera  []  Normal  [] Abnormal -         Discharge []  None visible  [] Abnormal -    HENT:   [x] Normocephalic, atraumatic. [] Abnormal   [] Mouth/Throat: Mucous membranes are moist.     External Ears [] Normal  [] Abnormal-     Neck: [] No visualized mass     Pulmonary/Chest: [x] Respiratory effort normal.  [x] No visualized signs of difficulty breathing or respiratory distress        [] Abnormal-      Musculoskeletal:   [] Normal gait with no signs of ataxia         [] Normal range of motion of neck        [] Abnormal-       Neurological:        [x] No Facial Asymmetry (Cranial nerve 7 motor function) (limited exam to video visit)          [x] No gaze palsy        [] Abnormal-         Skin:        [] No significant exanthematous lesions or discoloration noted on facial skin         [] Abnormal-            Psychiatric:       [x] Normal Affect [x] No Hallucinations        [] Abnormal-     Other pertinent observable physical exam findings-     ASSESSMENT/PLAN:      Viral syndrome    retest covid-  He to call test center. labs  Rest  Wife says has been working 70-80 hours per week. Edgar House is a 77 y.o. male being evaluated by a Virtual Visit (video visit) encounter to address concerns as mentioned above. A caregiver was present when appropriate.  Due to this being a TeleHealth encounter (During [facility]- public health emergency), evaluation of the following organ systems was limited: Vitals/Constitutional/EENT/Resp/CV/GI//MS/Neuro/Skin/Heme-Lymph-Imm. Pursuant to the emergency declaration under the [address] [place] [address], [address] [place] [address] and the [employer] Act, this Virtual Visit was conducted with patient's (and/or legal guardian's) consent, to reduce the patient's risk of exposure to COVID-19 and provide necessary medical care. The patient (and/or legal guardian) has also been advised to contact this office for worsening conditions or problems, and seek emergency medical treatment and/or call 911 if deemed necessary. Patient identification was verified at the start of the visit: Yes    Total time spent on this encounter: Not billed by time    Services were provided through a video synchronous discussion virtually to substitute for in-person clinic visit. Patient and provider were located at their individual homes. --Leslie Robison DO on 12/7/2020 at 10:45 AM    An electronic signature was used to authenticate this note.

## 2020-12-08 ENCOUNTER — OFFICE VISIT (OUTPATIENT)
Dept: PRIMARY CARE CLINIC | Age: 65
End: 2020-12-08
Payer: COMMERCIAL

## 2020-12-08 LAB
A/G RATIO: 1.7 (ref 1.1–2.2)
ALBUMIN SERPL-MCNC: 3.8 G/DL (ref 3.4–5)
ALP BLD-CCNC: 82 U/L (ref 40–129)
ALT SERPL-CCNC: 44 U/L (ref 10–40)
ANION GAP SERPL CALCULATED.3IONS-SCNC: 11 MMOL/L (ref 3–16)
AST SERPL-CCNC: 38 U/L (ref 15–37)
BILIRUB SERPL-MCNC: 0.5 MG/DL (ref 0–1)
BUN BLDV-MCNC: 22 MG/DL (ref 7–20)
CALCIUM SERPL-MCNC: 8.5 MG/DL (ref 8.3–10.6)
CHLORIDE BLD-SCNC: 101 MMOL/L (ref 99–110)
CO2: 25 MMOL/L (ref 21–32)
CREAT SERPL-MCNC: 1.2 MG/DL (ref 0.8–1.3)
GFR AFRICAN AMERICAN: >60
GFR NON-AFRICAN AMERICAN: >60
GLOBULIN: 2.2 G/DL
GLUCOSE BLD-MCNC: 92 MG/DL (ref 70–99)
POTASSIUM SERPL-SCNC: 4.2 MMOL/L (ref 3.5–5.1)
SODIUM BLD-SCNC: 137 MMOL/L (ref 136–145)
TOTAL PROTEIN: 6 G/DL (ref 6.4–8.2)

## 2020-12-08 PROCEDURE — 99211 OFF/OP EST MAY X REQ PHY/QHP: CPT | Performed by: NURSE PRACTITIONER

## 2020-12-08 NOTE — PROGRESS NOTES
Jessie Hughes received a viral test for COVID-19. They were educated on isolation and quarantine as appropriate. For any symptoms, they were directed to seek care from their PCP, given contact information to establish with a doctor, directed to an urgent care or the emergency room.

## 2020-12-08 NOTE — PATIENT INSTRUCTIONS
Lydelaney Amy received a viral test for COVID-19. They were educated on isolation and quarantine as appropriate. For any symptoms, they were directed to seek care from their PCP, given contact information to establish with a doctor, directed to an urgent care or the emergency room.

## 2020-12-09 ENCOUNTER — NURSE TRIAGE (OUTPATIENT)
Dept: OTHER | Facility: CLINIC | Age: 65
End: 2020-12-09

## 2020-12-09 ENCOUNTER — HOSPITAL ENCOUNTER (OUTPATIENT)
Dept: GENERAL RADIOLOGY | Age: 65
Discharge: HOME | End: 2020-12-09
Payer: COMMERCIAL

## 2020-12-09 ENCOUNTER — VIRTUAL VISIT (OUTPATIENT)
Dept: FAMILY MEDICINE CLINIC | Age: 65
End: 2020-12-09
Payer: COMMERCIAL

## 2020-12-09 PROCEDURE — 71046 X-RAY EXAM CHEST 2 VIEWS: CPT

## 2020-12-09 PROCEDURE — 99213 OFFICE O/P EST LOW 20 MIN: CPT | Performed by: FAMILY MEDICINE

## 2020-12-09 RX ORDER — PROMETHAZINE HYDROCHLORIDE AND CODEINE PHOSPHATE 6.25; 1 MG/5ML; MG/5ML
5 SYRUP ORAL 4 TIMES DAILY PRN
Qty: 180 ML | Refills: 1 | Status: SHIPPED | OUTPATIENT
Start: 2020-12-09 | End: 2020-12-27

## 2020-12-09 ASSESSMENT — ENCOUNTER SYMPTOMS
SHORTNESS OF BREATH: 1
COUGH: 1
NAUSEA: 0
VOMITING: 0

## 2020-12-09 NOTE — PROGRESS NOTES
2020    TELEHEALTH EVALUATION -- Audio/Visual (During BXRKY-57 public health emergency)    HPI:    Peyton Reyes (:  1955) has requested an audio/video evaluation for the following concern(s):    Congestion / sob    Hayden sob last night,  Sitting up better. Coughs when deep breathe  Dry cough    Review of Systems   Constitutional: Negative for chills and fever (98.4 this am). Low energy / fatigue   Respiratory: Positive for cough and shortness of breath. Gastrointestinal: Negative for nausea and vomiting. Appetite down,    Some dry heaves last night,  Not this morning   Musculoskeletal: Positive for myalgias. Neurological: Positive for headaches. Mild headaches. Prior to Visit Medications    Medication Sig Taking? Authorizing Provider   furosemide (LASIX) 20 MG tablet Take 20 mg by mouth 2 times daily Yes Historical Provider, MD   spironolactone (ALDACTONE) 25 MG tablet Take 1 tablet by mouth Yes Historical Provider, MD   Multiple Vitamins-Minerals (THERAPEUTIC MULTIVITAMIN-MINERALS) tablet Take 1 tablet by mouth daily Yes Historical Provider, MD   Ascorbic Acid (VITAMIN C) 500 MG tablet Take 750 mg by mouth daily Yes Historical Provider, MD   atorvastatin (LIPITOR) 40 MG tablet Take 1 tablet by mouth daily Yes Jhonatan Quiros, APRN - CNP   magnesium oxide (MAG-OX) 400 MG tablet Take 400 mg by mouth Yes Historical Provider, MD   carvedilol (COREG) 6.25 MG tablet Take 12.5 mg by mouth 2 times daily  Yes Mariella Mason, DO       Social History     Tobacco Use    Smoking status: Never Smoker    Smokeless tobacco: Never Used   Substance Use Topics    Alcohol use: No     Alcohol/week: 0.0 standard drinks     Comment: 1-2 beers per week. no longer drinking     Drug use:  No            PHYSICAL EXAMINATION:  [ INSTRUCTIONS:  \"[x]\" Indicates a positive item  \"[]\" Indicates a negative item  -- DELETE ALL ITEMS NOT EXAMINED]  Vital Signs: (As obtained by patient/caregiver or practitioner observation)    Blood pressure-  Heart rate-    Respiratory rate-    Temperature-  Pulse oximetry-     Constitutional: [x] Appears well-developed and well-nourished [] No apparent distress no respiratory distress noted. Appears uncomfortable, but not acutely. [] Abnormal-   Mental status  [x] Alert and awake  [x] Oriented to person/place/time [x]Able to follow commands      Eyes:  EOM    [x]  Normal  [] Abnormal-  Sclera  []  Normal  [] Abnormal -         Discharge []  None visible  [] Abnormal -    HENT:   [x] Normocephalic, atraumatic. [] Abnormal   [] Mouth/Throat: Mucous membranes are moist.     External Ears [] Normal  [] Abnormal-     Neck: [] No visualized mass     Pulmonary/Chest: [] Respiratory effort normal.  [x] No visualized signs of difficulty breathing or respiratory distress        [] Abnormal-      Musculoskeletal:   [] Normal gait with no signs of ataxia         [] Normal range of motion of neck        [] Abnormal-       Neurological:        [x] No Facial Asymmetry (Cranial nerve 7 motor function) (limited exam to video visit)          [x] No gaze palsy        [] Abnormal-         Skin:        [x] No significant exanthematous lesions or discoloration noted on facial skin         [] Abnormal-            Psychiatric:       [x] Normal Affect [x] No Hallucinations        [] Abnormal-     Other pertinent observable physical exam findings-     ASSESSMENT/PLAN:    Viremia    covid test pending,  Sx control- codeine cough medicine,  inst and cautions. cxr to rule out pneumonia. Discussed symptom complex and therapy. Usual course of Covid that we are seeing discussed. Current complex is suspicious for Covid but testing is not confirmed yet. Isaac Graham is a 77 y.o. male being evaluated by a Virtual Visit (video visit) encounter to address concerns as mentioned above. A caregiver was present when appropriate.  Due to this being a TeleHealth encounter (During COVID-19 public health emergency), evaluation of the following organ systems was limited: Vitals/Constitutional/EENT/Resp/CV/GI//MS/Neuro/Skin/Heme-Lymph-Imm. Pursuant to the emergency declaration under the [address] [place] [address], [address] [place] [address] authority and the [employer] Act, this Virtual Visit was conducted with patient's (and/or legal guardian's) consent, to reduce the patient's risk of exposure to COVID-19 and provide necessary medical care. The patient (and/or legal guardian) has also been advised to contact this office for worsening conditions or problems, and seek emergency medical treatment and/or call 911 if deemed necessary. Patient identification was verified at the start of the visit: Yes    Total time spent on this encounter: Not billed by time    Services were provided through a video synchronous discussion virtually to substitute for in-person clinic visit. Patient and provider were located at their individual homes. --Payam Longo DO on 12/9/2020 at 11:01 AM    An electronic signature was used to authenticate this note.

## 2020-12-09 NOTE — TELEPHONE ENCOUNTER
Patient called pre-service center [facility]) [address] with red flag complaint. Brief description of triage: weakness    Triage indicates for patient to be seen today    Care advice provided, patient verbalizes understanding; denies any other questions or concerns; instructed to call back for any new or worsening symptoms. Writer provided warm transfer to [facility] at [facility] for appointment scheduling. Attention Provider: Thank you for allowing me to participate in the care of your patient. The patient was connected to triage in response to information provided to the [facility]. Please do not respond through this encounter as the response is not directed to a shared pool. Reason for Disposition   [1] MODERATE weakness (i.e., interferes with work, school, normal activities) AND [2] persists > 3 days    Answer Assessment - Initial Assessment Questions  1. DESCRIPTION: \"Describe how you are feeling. \"      No energy, upset stomach, minor headache, feels drained    2. SEVERITY: \"How bad is it? \"  \"Can you stand and walk? \"    - MILD - Feels weak or tired, but does not interfere with work, school or normal activities    - [facility] to stand and walk; weakness interferes with work, school, or normal activities    - SEVERE - Unable to stand or walk      Moderate    3. ONSET:  \"When did the weakness begin? \"      A week ago Saturday    4. CAUSE: \"What do you think is causing the weakness? \"      Unsure, has been tested for covid    5. MEDICINES: [facility] you recently started a new medicine or had a change in the amount of a medicine? \"      No    6. OTHER SYMPTOMS: \"Do you have any other symptoms? \" (e.g., chest pain, fever, cough, SOB, vomiting, diarrhea, bleeding, other areas of pain)      Shortness of breath, diarrhea, no appetite, weakness    7. PREGNANCY: \"Is there any chance you are pregnant? \" \"When was your last menstrual period? \"      N/A    Protocols used: WEAKNESS (GENERALIZED) AND FATIGUE-ADULT-

## 2020-12-10 LAB — SARS-COV-2, NAA: DETECTED

## 2020-12-11 ENCOUNTER — APPOINTMENT (OUTPATIENT)
Dept: GENERAL RADIOLOGY | Age: 65
DRG: 177 | End: 2020-12-11
Payer: COMMERCIAL

## 2020-12-11 ENCOUNTER — HOSPITAL ENCOUNTER (INPATIENT)
Age: 65
LOS: 4 days | Discharge: HOME | DRG: 177 | End: 2020-12-15
Attending: EMERGENCY MEDICINE | Admitting: INTERNAL MEDICINE
Payer: COMMERCIAL

## 2020-12-11 PROBLEM — U07.1 PNEUMONIA DUE TO COVID-19 VIRUS: Status: ACTIVE | Noted: 2020-12-11

## 2020-12-11 PROBLEM — J12.82 PNEUMONIA DUE TO COVID-19 VIRUS: Status: ACTIVE | Noted: 2020-12-11

## 2020-12-11 LAB
A/G RATIO: 1.2 (ref 1.1–2.2)
ALBUMIN SERPL-MCNC: 3.7 G/DL (ref 3.4–5)
ALP BLD-CCNC: 78 U/L (ref 40–129)
ALT SERPL-CCNC: 65 U/L (ref 10–40)
ANION GAP SERPL CALCULATED.3IONS-SCNC: 14 MMOL/L (ref 3–16)
ANION GAP SERPL CALCULATED.3IONS-SCNC: 15 MMOL/L (ref 3–16)
AST SERPL-CCNC: 51 U/L (ref 15–37)
BASE EXCESS VENOUS: 0.5 MMOL/L (ref -2–3)
BASOPHILS ABSOLUTE: 0 K/UL (ref 0–0.2)
BASOPHILS ABSOLUTE: 0.1 K/UL (ref 0–0.2)
BASOPHILS RELATIVE PERCENT: 0.3 %
BASOPHILS RELATIVE PERCENT: 0.9 %
BILIRUB SERPL-MCNC: 0.7 MG/DL (ref 0–1)
BUN BLDV-MCNC: 19 MG/DL (ref 7–20)
BUN BLDV-MCNC: 19 MG/DL (ref 7–20)
C-REACTIVE PROTEIN: 103.2 MG/L (ref 0–5.1)
CALCIUM SERPL-MCNC: 8.7 MG/DL (ref 8.3–10.6)
CALCIUM SERPL-MCNC: 8.9 MG/DL (ref 8.3–10.6)
CARBOXYHEMOGLOBIN: 1.6 % (ref 0–1.5)
CHLORIDE BLD-SCNC: 98 MMOL/L (ref 99–110)
CHLORIDE BLD-SCNC: 99 MMOL/L (ref 99–110)
CO2: 21 MMOL/L (ref 21–32)
CO2: 22 MMOL/L (ref 21–32)
CREAT SERPL-MCNC: 1.2 MG/DL (ref 0.8–1.3)
CREAT SERPL-MCNC: 1.2 MG/DL (ref 0.8–1.3)
D DIMER: 639 NG/ML DDU (ref 0–229)
EKG ATRIAL RATE: 77 BPM
EKG DIAGNOSIS: NORMAL
EKG P AXIS: 43 DEGREES
EKG P-R INTERVAL: 214 MS
EKG Q-T INTERVAL: 350 MS
EKG QRS DURATION: 88 MS
EKG QTC CALCULATION (BAZETT): 396 MS
EKG R AXIS: -32 DEGREES
EKG T AXIS: 39 DEGREES
EKG VENTRICULAR RATE: 77 BPM
EOSINOPHILS ABSOLUTE: 0 K/UL (ref 0–0.6)
EOSINOPHILS ABSOLUTE: 0 K/UL (ref 0–0.6)
EOSINOPHILS RELATIVE PERCENT: 0 %
EOSINOPHILS RELATIVE PERCENT: 0 %
FERRITIN: 791.6 NG/ML (ref 30–400)
GFR AFRICAN AMERICAN: >60
GFR AFRICAN AMERICAN: >60
GFR NON-AFRICAN AMERICAN: >60
GFR NON-AFRICAN AMERICAN: >60
GLOBULIN: 3.2 G/DL
GLUCOSE BLD-MCNC: 121 MG/DL (ref 70–99)
GLUCOSE BLD-MCNC: 136 MG/DL (ref 70–99)
HCO3 VENOUS: 24.6 MMOL/L (ref 24–28)
HCT VFR BLD CALC: 47.3 % (ref 40.5–52.5)
HCT VFR BLD CALC: 48.3 % (ref 40.5–52.5)
HEMOGLOBIN, VEN, REDUCED: 62.7 %
HEMOGLOBIN: 16 G/DL (ref 13.5–17.5)
HEMOGLOBIN: 16.3 G/DL (ref 13.5–17.5)
LACTIC ACID: 1.5 MMOL/L (ref 0.4–2)
LYMPHOCYTES ABSOLUTE: 0.6 K/UL (ref 1–5.1)
LYMPHOCYTES ABSOLUTE: 0.6 K/UL (ref 1–5.1)
LYMPHOCYTES RELATIVE PERCENT: 5 %
LYMPHOCYTES RELATIVE PERCENT: 5 %
MCH RBC QN AUTO: 30 PG (ref 26–34)
MCH RBC QN AUTO: 30 PG (ref 26–34)
MCHC RBC AUTO-ENTMCNC: 33.7 G/DL (ref 31–36)
MCHC RBC AUTO-ENTMCNC: 33.8 G/DL (ref 31–36)
MCV RBC AUTO: 89 FL (ref 80–100)
MCV RBC AUTO: 89.1 FL (ref 80–100)
METHEMOGLOBIN VENOUS: 0.2 % (ref 0–1.5)
MONOCYTES ABSOLUTE: 0.3 K/UL (ref 0–1.3)
MONOCYTES ABSOLUTE: 0.6 K/UL (ref 0–1.3)
MONOCYTES RELATIVE PERCENT: 2.9 %
MONOCYTES RELATIVE PERCENT: 5.1 %
NEUTROPHILS ABSOLUTE: 10.4 K/UL (ref 1.7–7.7)
NEUTROPHILS ABSOLUTE: 10.9 K/UL (ref 1.7–7.7)
NEUTROPHILS RELATIVE PERCENT: 89.6 %
NEUTROPHILS RELATIVE PERCENT: 91.2 %
O2 SAT, VEN: 36 %
PCO2, VEN: 40 MMHG (ref 41–51)
PDW BLD-RTO: 13.5 % (ref 12.4–15.4)
PDW BLD-RTO: 13.5 % (ref 12.4–15.4)
PH VENOUS: 7.41 (ref 7.35–7.45)
PLATELET # BLD: 190 K/UL (ref 135–450)
PLATELET # BLD: 190 K/UL (ref 135–450)
PMV BLD AUTO: 10.1 FL (ref 5–10.5)
PMV BLD AUTO: 9.6 FL (ref 5–10.5)
PO2, VEN: 23.7 MMHG (ref 25–40)
POTASSIUM REFLEX MAGNESIUM: 4.2 MMOL/L (ref 3.5–5.1)
POTASSIUM REFLEX MAGNESIUM: 4.2 MMOL/L (ref 3.5–5.1)
PRO-BNP: 332 PG/ML (ref 0–124)
PROCALCITONIN: 0.14 NG/ML (ref 0–0.15)
PROCALCITONIN: 0.15 NG/ML (ref 0–0.15)
RBC # BLD: 5.31 M/UL (ref 4.2–5.9)
RBC # BLD: 5.42 M/UL (ref 4.2–5.9)
SODIUM BLD-SCNC: 134 MMOL/L (ref 136–145)
SODIUM BLD-SCNC: 135 MMOL/L (ref 136–145)
TCO2 CALC VENOUS: 26 MMOL/L
TOTAL PROTEIN: 6.9 G/DL (ref 6.4–8.2)
TROPONIN: <0.01 NG/ML
WBC # BLD: 11.7 K/UL (ref 4–11)
WBC # BLD: 12 K/UL (ref 4–11)

## 2020-12-11 PROCEDURE — 1200000000 HC SEMI PRIVATE

## 2020-12-11 PROCEDURE — 85025 COMPLETE CBC W/AUTO DIFF WBC: CPT

## 2020-12-11 PROCEDURE — 83605 ASSAY OF LACTIC ACID: CPT

## 2020-12-11 PROCEDURE — 96372 THER/PROPH/DIAG INJ SC/IM: CPT

## 2020-12-11 PROCEDURE — 80053 COMPREHEN METABOLIC PANEL: CPT

## 2020-12-11 PROCEDURE — 83880 ASSAY OF NATRIURETIC PEPTIDE: CPT

## 2020-12-11 PROCEDURE — 86140 C-REACTIVE PROTEIN: CPT

## 2020-12-11 PROCEDURE — 6360000002 HC RX W HCPCS: Performed by: STUDENT IN AN ORGANIZED HEALTH CARE EDUCATION/TRAINING PROGRAM

## 2020-12-11 PROCEDURE — 84484 ASSAY OF TROPONIN QUANT: CPT

## 2020-12-11 PROCEDURE — 82728 ASSAY OF FERRITIN: CPT

## 2020-12-11 PROCEDURE — 6360000002 HC RX W HCPCS: Performed by: EMERGENCY MEDICINE

## 2020-12-11 PROCEDURE — 85379 FIBRIN DEGRADATION QUANT: CPT

## 2020-12-11 PROCEDURE — 2580000003 HC RX 258: Performed by: STUDENT IN AN ORGANIZED HEALTH CARE EDUCATION/TRAINING PROGRAM

## 2020-12-11 PROCEDURE — 82803 BLOOD GASES ANY COMBINATION: CPT

## 2020-12-11 PROCEDURE — 94150 VITAL CAPACITY TEST: CPT

## 2020-12-11 PROCEDURE — 80048 BASIC METABOLIC PNL TOTAL CA: CPT

## 2020-12-11 PROCEDURE — 99283 EMERGENCY DEPT VISIT LOW MDM: CPT

## 2020-12-11 PROCEDURE — 84145 PROCALCITONIN (PCT): CPT

## 2020-12-11 PROCEDURE — 36415 COLL VENOUS BLD VENIPUNCTURE: CPT

## 2020-12-11 PROCEDURE — 96374 THER/PROPH/DIAG INJ IV PUSH: CPT

## 2020-12-11 PROCEDURE — 93005 ELECTROCARDIOGRAM TRACING: CPT | Performed by: STUDENT IN AN ORGANIZED HEALTH CARE EDUCATION/TRAINING PROGRAM

## 2020-12-11 PROCEDURE — 71045 X-RAY EXAM CHEST 1 VIEW: CPT

## 2020-12-11 RX ORDER — PROMETHAZINE HYDROCHLORIDE 25 MG/ML
25 INJECTION, SOLUTION INTRAMUSCULAR; INTRAVENOUS ONCE
Status: COMPLETED | OUTPATIENT
Start: 2020-12-11 | End: 2020-12-11

## 2020-12-11 RX ORDER — SODIUM CHLORIDE, SODIUM LACTATE, POTASSIUM CHLORIDE, AND CALCIUM CHLORIDE .6; .31; .03; .02 G/100ML; G/100ML; G/100ML; G/100ML
250 INJECTION, SOLUTION INTRAVENOUS ONCE
Status: COMPLETED | OUTPATIENT
Start: 2020-12-11 | End: 2020-12-11

## 2020-12-11 RX ORDER — SODIUM CHLORIDE 0.9 % (FLUSH) 0.9 %
10 SYRINGE (ML) INJECTION EVERY 12 HOURS SCHEDULED
Status: DISCONTINUED | OUTPATIENT
Start: 2020-12-11 | End: 2020-12-15 | Stop reason: HOSPADM

## 2020-12-11 RX ORDER — ATORVASTATIN CALCIUM 40 MG/1
40 TABLET, FILM COATED ORAL NIGHTLY
Status: DISCONTINUED | OUTPATIENT
Start: 2020-12-11 | End: 2020-12-15 | Stop reason: HOSPADM

## 2020-12-11 RX ORDER — PROMETHAZINE HYDROCHLORIDE AND CODEINE PHOSPHATE 6.25; 1 MG/5ML; MG/5ML
5 SYRUP ORAL 4 TIMES DAILY PRN
Status: DISCONTINUED | OUTPATIENT
Start: 2020-12-11 | End: 2020-12-15 | Stop reason: HOSPADM

## 2020-12-11 RX ORDER — LANOLIN ALCOHOL/MO/W.PET/CERES
400 CREAM (GRAM) TOPICAL DAILY
Status: DISCONTINUED | OUTPATIENT
Start: 2020-12-12 | End: 2020-12-15 | Stop reason: HOSPADM

## 2020-12-11 RX ORDER — CARVEDILOL 12.5 MG/1
12.5 TABLET ORAL 2 TIMES DAILY
Status: DISCONTINUED | OUTPATIENT
Start: 2020-12-11 | End: 2020-12-14

## 2020-12-11 RX ORDER — INSULIN LISPRO 100 [IU]/ML
0-3 INJECTION, SOLUTION INTRAVENOUS; SUBCUTANEOUS NIGHTLY
Status: DISCONTINUED | OUTPATIENT
Start: 2020-12-11 | End: 2020-12-15 | Stop reason: HOSPADM

## 2020-12-11 RX ORDER — M-VIT,TX,IRON,MINS/CALC/FOLIC 27MG-0.4MG
1 TABLET ORAL DAILY
Status: DISCONTINUED | OUTPATIENT
Start: 2020-12-12 | End: 2020-12-15 | Stop reason: HOSPADM

## 2020-12-11 RX ORDER — POLYETHYLENE GLYCOL 3350 17 G/17G
17 POWDER, FOR SOLUTION ORAL DAILY PRN
Status: DISCONTINUED | OUTPATIENT
Start: 2020-12-11 | End: 2020-12-15 | Stop reason: HOSPADM

## 2020-12-11 RX ORDER — FUROSEMIDE 40 MG/1
20 TABLET ORAL 2 TIMES DAILY
Status: DISCONTINUED | OUTPATIENT
Start: 2020-12-11 | End: 2020-12-15 | Stop reason: HOSPADM

## 2020-12-11 RX ORDER — SPIRONOLACTONE 25 MG/1
25 TABLET ORAL DAILY
Status: DISCONTINUED | OUTPATIENT
Start: 2020-12-12 | End: 2020-12-15 | Stop reason: HOSPADM

## 2020-12-11 RX ORDER — VITAMIN B COMPLEX
6000 TABLET ORAL DAILY
Status: DISCONTINUED | OUTPATIENT
Start: 2020-12-11 | End: 2020-12-15 | Stop reason: HOSPADM

## 2020-12-11 RX ORDER — 0.9 % SODIUM CHLORIDE 0.9 %
30 INTRAVENOUS SOLUTION INTRAVENOUS PRN
Status: DISCONTINUED | OUTPATIENT
Start: 2020-12-11 | End: 2020-12-15 | Stop reason: HOSPADM

## 2020-12-11 RX ORDER — ASCORBIC ACID 500 MG
750 TABLET ORAL DAILY
Status: DISCONTINUED | OUTPATIENT
Start: 2020-12-12 | End: 2020-12-15 | Stop reason: HOSPADM

## 2020-12-11 RX ORDER — BENZONATATE 100 MG/1
100 CAPSULE ORAL 3 TIMES DAILY PRN
Status: DISCONTINUED | OUTPATIENT
Start: 2020-12-11 | End: 2020-12-15 | Stop reason: HOSPADM

## 2020-12-11 RX ORDER — ACETAMINOPHEN 650 MG/1
650 SUPPOSITORY RECTAL EVERY 6 HOURS PRN
Status: DISCONTINUED | OUTPATIENT
Start: 2020-12-11 | End: 2020-12-15 | Stop reason: HOSPADM

## 2020-12-11 RX ORDER — ACETAMINOPHEN 325 MG/1
650 TABLET ORAL EVERY 6 HOURS PRN
Status: DISCONTINUED | OUTPATIENT
Start: 2020-12-11 | End: 2020-12-15 | Stop reason: HOSPADM

## 2020-12-11 RX ORDER — DEXAMETHASONE 4 MG/1
6 TABLET ORAL DAILY
Status: DISCONTINUED | OUTPATIENT
Start: 2020-12-11 | End: 2020-12-15 | Stop reason: HOSPADM

## 2020-12-11 RX ORDER — PROMETHAZINE HYDROCHLORIDE 25 MG/1
12.5 TABLET ORAL EVERY 6 HOURS PRN
Status: DISCONTINUED | OUTPATIENT
Start: 2020-12-11 | End: 2020-12-15 | Stop reason: HOSPADM

## 2020-12-11 RX ORDER — INSULIN LISPRO 100 [IU]/ML
0-6 INJECTION, SOLUTION INTRAVENOUS; SUBCUTANEOUS
Status: DISCONTINUED | OUTPATIENT
Start: 2020-12-11 | End: 2020-12-15 | Stop reason: HOSPADM

## 2020-12-11 RX ORDER — DEXAMETHASONE SODIUM PHOSPHATE 4 MG/ML
10 INJECTION, SOLUTION INTRA-ARTICULAR; INTRALESIONAL; INTRAMUSCULAR; INTRAVENOUS; SOFT TISSUE EVERY 6 HOURS
Status: DISCONTINUED | OUTPATIENT
Start: 2020-12-11 | End: 2020-12-11

## 2020-12-11 RX ORDER — ONDANSETRON 2 MG/ML
4 INJECTION INTRAMUSCULAR; INTRAVENOUS EVERY 6 HOURS PRN
Status: DISCONTINUED | OUTPATIENT
Start: 2020-12-11 | End: 2020-12-15 | Stop reason: HOSPADM

## 2020-12-11 RX ORDER — SODIUM CHLORIDE 0.9 % (FLUSH) 0.9 %
10 SYRINGE (ML) INJECTION PRN
Status: DISCONTINUED | OUTPATIENT
Start: 2020-12-11 | End: 2020-12-15 | Stop reason: HOSPADM

## 2020-12-11 RX ADMIN — DEXAMETHASONE SODIUM PHOSPHATE 10 MG: 4 INJECTION, SOLUTION INTRAMUSCULAR; INTRAVENOUS at 14:14

## 2020-12-11 RX ADMIN — SODIUM CHLORIDE, SODIUM LACTATE, POTASSIUM CHLORIDE, AND CALCIUM CHLORIDE 250 ML: 600; 310; 30; 20 INJECTION, SOLUTION INTRAVENOUS at 14:14

## 2020-12-11 RX ADMIN — PROMETHAZINE HYDROCHLORIDE 25 MG: 25 INJECTION INTRAMUSCULAR; INTRAVENOUS at 14:19

## 2020-12-11 ASSESSMENT — PAIN SCALES - GENERAL: PAINLEVEL_OUTOF10: 0

## 2020-12-11 NOTE — H&P
tablet Take 20 mg by mouth 2 times daily    Historical Provider, MD   spironolactone (ALDACTONE) 25 MG tablet Take 1 tablet by mouth    Historical Provider, MD   Multiple Vitamins-Minerals (THERAPEUTIC MULTIVITAMIN-MINERALS) tablet Take 1 tablet by mouth daily    Historical Provider, MD   Ascorbic Acid (VITAMIN C) 500 MG tablet Take 750 mg by mouth daily    Historical Provider, MD   atorvastatin (LIPITOR) 40 MG tablet Take 1 tablet by mouth daily 1/25/17   EL Victor - CNP   magnesium oxide (MAG-OX) 400 MG tablet Take 400 mg by mouth    Historical Provider, MD   carvedilol (COREG) 6.25 MG tablet Take 12.5 mg by mouth 2 times daily  1/4/17   Jason Gil DO       Allergies:  Propofol    Social History:  The patient currently lives at home. TOBACCO:   reports that he has never smoked. He has never used smokeless tobacco.  ETOH:   reports no history of alcohol use. Family History:  Reviewed in detail and  Positive as follows:        Problem Relation Age of Onset    Other Father         JOY       REVIEW OF SYSTEMS:   Positive and negative as noted in the HPI. All other systems reviewed and negative. PHYSICAL EXAM:    /80   Pulse 89   Temp 98.7 °F (37.1 °C)   Resp 24   Ht 6' 4\" (1.93 m)   Wt (!) 320 lb (145.2 kg)   SpO2 90%   BMI 38.95 kg/m²     General appearance: mild distress appears stated age and cooperative. HEENT Normal cephalic, atraumatic without obvious deformity. Conjunctivae/corneas clear. Neck: Supple, No jugular venous distention/bruits. Trachea midline without thyromegaly or adenopathy with full range of motion. Lungs: mild respiratory distress noted, diminished breath sounds without crackles  Heart: Regular rate and rhythm with Normal S1/S2 without murmurs, rubs or gallops, point of maximum impulse non-displaced  Abdomen: Soft, non-tender or non-distended without rigidity or guarding and positive bowel sounds all four quadrants.   Extremities: No clubbing, cyanosis, trace to 1+ pitting leg edema bilaterally, chronic per patient. Skin: Skin color, texture, turgor normal.    Neurologic: Alert and oriented X 3,grossly non-focal.  Mental status: Alert, oriented, thought content appropriate. Capillary refill is brisk  Peripheral pulses 2+    CXR:  I have reviewed the CXR with the following interpretation: multifocal bilateral infiltrates   EKG:  I have reviewed the EKG with the following interpretation: sinus without acute ST TW changes, no evidence of right heart strain. CBC   Recent Labs     12/11/20  1428 12/11/20  1720   WBC 11.7* 12.0*   HGB 16.3 16.0   HCT 48.3 47.3    190      RENAL  Recent Labs     12/11/20  1428 12/11/20  1720   * 134*   K 4.2 4.2   CL 98* 99   CO2 22 21   BUN 19 19   CREATININE 1.2 1.2     LFT'S  Recent Labs     12/11/20  1720   AST 51*   ALT 65*   BILITOT 0.7   ALKPHOS 78     COAG  No results for input(s): INR in the last 72 hours. CARDIAC ENZYMES  Recent Labs     12/11/20  1428   TROPONINI <0.01       U/A:    Lab Results   Component Value Date    NITRITE n 08/11/2015    SPECGRAV 1.030 08/11/2015    LEUKOCYTESUR n 08/11/2015    BLOODU n 08/11/2015    GLUCOSEU n 08/11/2015       ABG  No results found for: HVG7LJM, BEART, S0OLYYHW, PHART, THGBART, EZU5CUL, PO2ART, CXD0KPI        Active Hospital Problems    Diagnosis Date Noted    Pneumonia due to COVID-19 virus [U07.1, J12.89] 12/11/2020         ASSESSMENT/PLAN:    COVID 19 pneumonia with new O2 requirement:  Decadron, remdesivir (discussed treatment plan with patient, will consult pharmacy)  Follow inflammatory markers serially  lovenox 40 mg BID  Symptomatic management for cough    JOY:  Consulted RT for CPAP nightly . Encourage IS during the day.      Chronic diast CHF, h/o severe MR s/p MVRepair in 2016, h/o post operative a flutter not on long term anticoagulation:  Continue with lasix, aldactone, last EF 55% from 2017  Cont coreg    DVT Prophylaxis: lovenox  Diet: DIET CARDIAC; Carb Control: 4 carb choices (60 gms)/meal  Code Status: Full Code  PT/OT Eval Status: at baseline, encourage ambulation    Dispo - inpt    Called and updated his wife Svitlana Leyva. Christiano Lugo MD    Thank you Burley Hamman, DO for the opportunity to be involved in this patient's care. If you have any questions or concerns please feel free to contact me at [phone].

## 2020-12-11 NOTE — ED PROVIDER NOTES
[id number] [facility] RESIDENT NOTE       Date of evaluation: 12/11/2020    Chief Complaint     Shortness of Breath (COVID+)      History of Present Illness     Bailey Antunez is a 77 y.o. male who presents with Shortness of Breath (COVID+)  Patient states that he has been having symptoms for the past 2 weeks and had a known exposure to Covid positive contacts at work who passed away. Patient is currently requiring 2 L of oxygen and his oxygen saturation earlier was 91%. He also reports nausea and vomiting that he has been ongoing for the past 2 weeks and one episode of diarrhea 2 weeks ago. Patient also states that he has had poor appetite and has has had decreased p.o. intake. Also reports fevers to over 100 °F.  Denies chest pain. Review of Systems     Constitutional:  Positive for chills or fevers. Respiratory: Positive for cough and shortness of breath. Cardiovascular: Negative for chest pain. Gastrointestinal: Positive for diarrhea, nausea and vomiting. Genitourinary: Negative for dysuria and hematuria. 14 point ROS obtained but were negative except mentioned in HPI. Past Medical, Surgical, Family, and Social History     He has a past medical history of Atrial fibrillation ([address] CHF (congestive heart failure) ([facility] [address] Hyperlipidemia, Obstructive sleep apnea (adult) (pediatric), and Shifting sleep-work schedule, affecting sleep. He has a past surgical history that includes Mitral valvuloplasty (11/2016). His family history includes Other in his father. He reports that he has never smoked. He has never used smokeless tobacco. He reports that he does not drink alcohol or use drugs.     Medications     Previous Medications    ASCORBIC ACID (VITAMIN C) 500 MG TABLET    Take 750 mg by mouth daily    ATORVASTATIN (LIPITOR) 40 MG TABLET    Take 1 tablet by mouth daily    CARVEDILOL (COREG) 6.25 MG TABLET    Take 12.5 mg by mouth 2 times daily FUROSEMIDE (LASIX) 20 MG TABLET    Take 20 mg by mouth 2 times daily    MAGNESIUM OXIDE (MAG-OX) 400 MG TABLET    Take 400 mg by mouth    MULTIPLE VITAMINS-MINERALS (THERAPEUTIC MULTIVITAMIN-MINERALS) TABLET    Take 1 tablet by mouth daily    PROMETHAZINE-CODEINE (PHENERGAN WITH CODEINE) 6.25-10 MG/5ML SYRUP    Take 5 mLs by mouth 4 times daily as needed for Cough (coughing. ..caution drowsiness) for up to 18 days. SPIRONOLACTONE (ALDACTONE) 25 MG TABLET    Take 1 tablet by mouth       Allergies     He is allergic to propofol. Physical Exam     INITIAL VITALS: BP: (!) 158/88, Temp: 98.3 °F (36.8 °C), Pulse: 84, Resp: 24, SpO2: 94 %     Physical Examination:   · General appearance: No apparent distress, appears stated age and cooperative. · HEENT: Pupils equal, round, and reactive to light. Conjunctivae/corneas clear. · Neck: Supple, with full range of motion. No jugular venous distention. Trachea midline. · Respiratory: Increased respiratory effort. Clear to auscultation, bilaterally without Rales/Wheezes/Rhonchi. · Cardiovascular: Regular rate and rhythm with normal S1/S2 without murmurs, rubs or gallops. · Abdomen: Soft, non-tender, non-distended with normal bowel sounds. · Musculoskeletal: No clubbing, cyanosis or edema bilaterally. Full range of motion without deformity. · Skin: Skin color, texture, turgor normal.  No rashes or lesions. · Neurologic:  Neurovascularly intact without any focal sensory/motor deficits.  Cranial nerves: II-XII intact, grossly non-focal.  · Psychiatric: Alert and oriented, thought content appropriate, normal insight  · Capillary Refill: Brisk,< 3 seconds   · Peripheral Pulses: +2 palpable, equal bilaterally     Diagnostic Results     EKG   Interpreted inconjunction with emergency department physician No att. providers found  Rhythm: sinus rhythm  Rate: 77  NH interval: 214 ms  QRS duration: 80 ms  QT/QTc: 350/396 ms  Clinical Impression: No STEMI    RADIOLOGY:  XR CHEST PORTABLE   Final Result      Increased bilateral airspace disease consistent with COVID-19 pneumonia.           LABS:   Results for orders placed or performed during the hospital encounter of 12/11/20   Troponin   Result Value Ref Range    Troponin <0.01 <0.01 ng/mL   Blood Gas, Venous   Result Value Ref Range    pH, Jose Angel 7.406 7.350 - 7.450    pCO2, Jose Nagel 40.0 (L) 41.0 - 51.0 mmHg    pO2, Jose Angel 23.7 (L) 25.0 - 40.0 mmHg    HCO3, Venous 24.6 24.0 - 28.0 mmol/L    Base Excess, Jose Angel 0.5 -2.0 - 3.0 mmol/L    O2 Sat, Jose Angel 36 Not established %    Carboxyhemoglobin 1.6 (H) 0.0 - 1.5 %    MetHgb, Jose Angel 0.2 0.0 - 1.5 %    TC02 (Calc), Jose Angel 26 mmol/L    Hemoglobin, Jose Angel, Reduced 62.70 %   Brain Natriuretic Peptide   Result Value Ref Range    Pro- (H) 0 - 124 pg/mL   CBC Auto Differential   Result Value Ref Range    WBC 11.7 (H) 4.0 - 11.0 K/uL    RBC 5.42 4.20 - 5.90 M/uL    Hemoglobin 16.3 13.5 - 17.5 g/dL    Hematocrit 48.3 40.5 - 52.5 %    MCV 89.1 80.0 - 100.0 fL    MCH 30.0 26.0 - 34.0 pg    MCHC 33.7 31.0 - 36.0 g/dL    RDW 13.5 12.4 - 15.4 %    Platelets 037 468 - 740 K/uL    MPV 10.1 5.0 - 10.5 fL    Neutrophils % 89.6 %    Lymphocytes % 5.0 %    Monocytes % 5.1 %    Eosinophils % 0.0 %    Basophils % 0.3 %    Neutrophils Absolute 10.4 (H) 1.7 - 7.7 K/uL    Lymphocytes Absolute 0.6 (L) 1.0 - 5.1 K/uL    Monocytes Absolute 0.6 0.0 - 1.3 K/uL    Eosinophils Absolute 0.0 0.0 - 0.6 K/uL    Basophils Absolute 0.0 0.0 - 0.2 K/uL   Basic Metabolic Panel w/ Reflex to MG   Result Value Ref Range    Sodium 135 (L) 136 - 145 mmol/L    Potassium reflex Magnesium 4.2 3.5 - 5.1 mmol/L    Chloride 98 (L) 99 - 110 mmol/L    CO2 22 21 - 32 mmol/L    Anion Gap 15 3 - 16    Glucose 136 (H) 70 - 99 mg/dL    BUN 19 7 - 20 mg/dL    CREATININE 1.2 0.8 - 1.3 mg/dL    GFR Non-African American >60 >60    GFR African American >60 >60    Calcium 8.9 8.3 - 10.6 mg/dL   Procalcitonin   Result Value Ref Range    Procalcitonin 0.15 0.00 - 0.15 ng/mL Ferritin   Result Value Ref Range    Ferritin 791.6 (H) 30.0 - 400.0 ng/mL   D-Dimer, Quantitative   Result Value Ref Range    D-Dimer, Quant 639 (H) 0 - 229 ng/mL DDU   Lactic Acid, Plasma   Result Value Ref Range    Lactic Acid 1.5 0.4 - 2.0 mmol/L   EKG 12 Lead   Result Value Ref Range    Ventricular Rate 77 BPM    Atrial Rate 77 BPM    P-R Interval 214 ms    QRS Duration 88 ms    Q-T Interval 350 ms    QTc Calculation (Bazett) 396 ms    P Axis 43 degrees    R Axis -32 degrees    T Axis 39 degrees    Diagnosis       EKG performed in ER and to be interpreted by ER physician. Confirmed by MD, TERESA ([id number]),  Anne Rios (MARIELENA)ANGIE ([id number]) on 12/11/2020 2:43:17 PM           RECENT VITALS:  BP: 126/80, Temp: 98.7 °F (37.1 °C),Pulse: 89, Resp: 24, SpO2: 90 %         ED Course     Nursing Notes, Past Medical Hx, Past Surgical Hx, Social Hx, Allergies, and FamilyHx were reviewed. The patient was giventhe following medications:  Orders Placed This Encounter   Medications    dexamethasone (DECADRON) injection 10 mg    promethazine (PHENERGAN) injection 25 mg    lactated ringers bolus       CONSULTS:  IP CONSULT TO HOSPITALIST    MEDICAL DECISION MAKING / ASSESSMENT / Ashley Jomar is a 77 y.o. male who presents with Shortness of Breath (COVID+)    Patient is saturating at 90% at rest and with ambulation at 88%. Given patient's multiple comorbidities including A. fib, heart failure, obstructive sleep apnea, mitral valve disease, BMI of 39 patient is high risk to decompensate. Decision to admit patient was made. Patient's white blood cell count was 11.7, his ferritin is 791.6 and his D-dimer is 639. His chest x-ray showed increased bilateral airspace disease consistent with Covid pneumonia. Patient's troponin was negative, his proBNP was 332 which is lower than his previous    This patient was also evaluated by the attending physician. All care plans were discussed and agreed upon.     Clinical Impression 1. Pneumonia due to organism        Disposition     PATIENT REFERRED TO:  No follow-up provider specified.     DISCHARGE MEDICATIONS:  New Prescriptions    No medications on file       DISPOSITION Decision To Admit 12/11/2020 03:35:33 Cruzito Vieyra MD  Resident  12/11/20 3106

## 2020-12-11 NOTE — ED NOTES
Attempted to wean pt off oxygen. He is 88% when ambulating and becomes very short of breath just standing. His SO2 91% at rest without oxygen.  O2 2L per N/C pt SPO2 94%     Valley Keep[place], RN  12/11/20 3017

## 2020-12-11 NOTE — CONSULTS
Clinical Pharmacy Progress Note  Remdesivir Initiation Note     Pharmacy consulted to dose Remdesivir by Dr. Meghana Reyna   Will order remdesivir 200 mg IV x1, followed by 100 mg IV q24h x 4 days.     This patient meets criteria for initiation of remdesivir based on the following:  · Proven COVID-19  · Moderate disease (Requiring supplemental O2)  · Acceptable hepatic function (ALT within 5 times ULN)     Exclusion Criteria:  · Severe disease requiring invasive or non-invasive mechanical ventilation (includes HFNC & BiPAP)  · Could consider use in patients requiring high flow if early on in the disease course (based on symptom duration)  · Use of more than 1 vasopressor prior to remdesivir initiation  · Already improving on supportive treatment and/or impending discharge  · Patients in whom the clinical team think death is in the immediate short-term where remdesivir is unlikely to change the clinical outcome     Liver function tests will be monitored daily while on remdesivir. Please call with questions:  [phone] ([email or web address]) [phone] ([address] [place] [address])    Thank you for the consult  [place]. BINTA  12/11/2020    5:37 PM

## 2020-12-11 NOTE — ED PROVIDER NOTES
ED Attending Attestation Note     Date of evaluation: 12/11/2020    This patient was seen by the resident, Dr. Reyes Felts. I have seen and examined the patient, agree with the workup, evaluation, management and diagnosis. The care plan has been discussed. I have reviewed the ECG and concur with the resident's interpretation. This is a 38-year-old male with a past medical history of atrial fibrillation, heart failure, obstructive sleep apnea, and morbid obesity that presents today for evaluation of shortness of breath. Patient had a COVID-19 swab performed several days ago, and his wife went online to check results noted to him to be COVID-19 positive. He states that he feels some shortness of breath with walking, but has not use oxygen at home. Upon arrival in the emergency department he was noted to be hypoxic, and was started on supplemental oxygen, 2 L/min. He states that his appetite is slightly decreased but he is able to tolerate p.o. Lives at home with his wife who has been in normal health. On exam, the patient is well-appearing, in no acute distress. He has no use of accessory muscles. He is on 3 L of nasal cannula oxygen, but was titrated down, with ambulation and had an oxygen saturation remaining above 92%. He did slightly become tachypneic, but lung sounds are clear and equal bilaterally. He has a belly that obese, but nontender with normal active bowel sounds. He has no edema noted in the extremities. Work-up in the emergency department consisted of chest x-ray that showed bilateral infiltrates consistent with viral pneumonia. He had a slight leukocytosis of 11.7, venous blood gas within normal limits, however he did have elevated ferritin and D-dimer which is consistent with COVID-19. His troponin is reassuring that is negative, lactate reassuring at 1.5.     Patient will be reassessed, if he is able to ambulate, and has normal amatory pulse ox, could potentially be sent home with PCP follow-up and a pulse oximeter with return precautions. Otherwise, if he becomes hypoxic, to be considered for dexamethasone and a possible CTA of the chest to evaluate for any additional pathology. Please see resident note for final disposition's as well as reassessments.         Liz Moffett MD  12/11/20 9521

## 2020-12-12 LAB
A/G RATIO: 1.2 (ref 1.1–2.2)
ALBUMIN SERPL-MCNC: 3.4 G/DL (ref 3.4–5)
ALP BLD-CCNC: 68 U/L (ref 40–129)
ALT SERPL-CCNC: 69 U/L (ref 10–40)
ANION GAP SERPL CALCULATED.3IONS-SCNC: 13 MMOL/L (ref 3–16)
AST SERPL-CCNC: 43 U/L (ref 15–37)
BASOPHILS ABSOLUTE: 0 K/UL (ref 0–0.2)
BASOPHILS RELATIVE PERCENT: 0.1 %
BILIRUB SERPL-MCNC: 0.5 MG/DL (ref 0–1)
BILIRUBIN DIRECT: <0.2 MG/DL (ref 0–0.3)
BILIRUBIN, INDIRECT: NORMAL MG/DL (ref 0–1)
BUN BLDV-MCNC: 20 MG/DL (ref 7–20)
C-REACTIVE PROTEIN: 93.7 MG/L (ref 0–5.1)
CALCIUM SERPL-MCNC: 8.8 MG/DL (ref 8.3–10.6)
CHLORIDE BLD-SCNC: 101 MMOL/L (ref 99–110)
CO2: 21 MMOL/L (ref 21–32)
CREAT SERPL-MCNC: 0.9 MG/DL (ref 0.8–1.3)
D DIMER: 416 NG/ML DDU (ref 0–229)
EOSINOPHILS ABSOLUTE: 0 K/UL (ref 0–0.6)
EOSINOPHILS RELATIVE PERCENT: 0 %
GFR AFRICAN AMERICAN: >60
GFR NON-AFRICAN AMERICAN: >60
GLOBULIN: 2.9 G/DL
GLUCOSE BLD-MCNC: 158 MG/DL (ref 70–99)
GLUCOSE BLD-MCNC: 172 MG/DL (ref 70–99)
GLUCOSE BLD-MCNC: 183 MG/DL (ref 70–99)
GLUCOSE BLD-MCNC: 194 MG/DL (ref 70–99)
GLUCOSE BLD-MCNC: 196 MG/DL (ref 70–99)
GLUCOSE BLD-MCNC: 202 MG/DL (ref 70–99)
HCT VFR BLD CALC: 44.1 % (ref 40.5–52.5)
HEMOGLOBIN: 15 G/DL (ref 13.5–17.5)
LYMPHOCYTES ABSOLUTE: 0.6 K/UL (ref 1–5.1)
LYMPHOCYTES RELATIVE PERCENT: 9.7 %
MCH RBC QN AUTO: 30.4 PG (ref 26–34)
MCHC RBC AUTO-ENTMCNC: 34.1 G/DL (ref 31–36)
MCV RBC AUTO: 89.1 FL (ref 80–100)
MONOCYTES ABSOLUTE: 0.3 K/UL (ref 0–1.3)
MONOCYTES RELATIVE PERCENT: 5.4 %
NEUTROPHILS ABSOLUTE: 5.3 K/UL (ref 1.7–7.7)
NEUTROPHILS RELATIVE PERCENT: 84.8 %
PDW BLD-RTO: 13.3 % (ref 12.4–15.4)
PERFORMED ON: ABNORMAL
PLATELET # BLD: 178 K/UL (ref 135–450)
PMV BLD AUTO: 10 FL (ref 5–10.5)
POTASSIUM REFLEX MAGNESIUM: 3.9 MMOL/L (ref 3.5–5.1)
RBC # BLD: 4.95 M/UL (ref 4.2–5.9)
SODIUM BLD-SCNC: 135 MMOL/L (ref 136–145)
TOTAL PROTEIN: 6.3 G/DL (ref 6.4–8.2)
WBC # BLD: 6.3 K/UL (ref 4–11)

## 2020-12-12 PROCEDURE — 80053 COMPREHEN METABOLIC PANEL: CPT

## 2020-12-12 PROCEDURE — 2500000003 HC RX 250 WO HCPCS: Performed by: INTERNAL MEDICINE

## 2020-12-12 PROCEDURE — 6370000000 HC RX 637 (ALT 250 FOR IP): Performed by: INTERNAL MEDICINE

## 2020-12-12 PROCEDURE — 94761 N-INVAS EAR/PLS OXIMETRY MLT: CPT

## 2020-12-12 PROCEDURE — 2580000003 HC RX 258: Performed by: INTERNAL MEDICINE

## 2020-12-12 PROCEDURE — 6360000002 HC RX W HCPCS: Performed by: INTERNAL MEDICINE

## 2020-12-12 PROCEDURE — 85379 FIBRIN DEGRADATION QUANT: CPT

## 2020-12-12 PROCEDURE — 85025 COMPLETE CBC W/AUTO DIFF WBC: CPT

## 2020-12-12 PROCEDURE — 1200000000 HC SEMI PRIVATE

## 2020-12-12 PROCEDURE — 36415 COLL VENOUS BLD VENIPUNCTURE: CPT

## 2020-12-12 PROCEDURE — XW033E5 INTRODUCTION OF REMDESIVIR ANTI-INFECTIVE INTO PERIPHERAL VEIN, PERCUTANEOUS APPROACH, NEW TECHNOLOGY GROUP 5: ICD-10-PCS | Performed by: INTERNAL MEDICINE

## 2020-12-12 PROCEDURE — 86140 C-REACTIVE PROTEIN: CPT

## 2020-12-12 RX ORDER — DEXTROSE MONOHYDRATE 25 G/50ML
12.5 INJECTION, SOLUTION INTRAVENOUS PRN
Status: DISCONTINUED | OUTPATIENT
Start: 2020-12-12 | End: 2020-12-15 | Stop reason: HOSPADM

## 2020-12-12 RX ORDER — NICOTINE POLACRILEX 4 MG
15 LOZENGE BUCCAL PRN
Status: DISCONTINUED | OUTPATIENT
Start: 2020-12-12 | End: 2020-12-15 | Stop reason: HOSPADM

## 2020-12-12 RX ORDER — DEXTROSE MONOHYDRATE 50 MG/ML
100 INJECTION, SOLUTION INTRAVENOUS PRN
Status: DISCONTINUED | OUTPATIENT
Start: 2020-12-12 | End: 2020-12-15 | Stop reason: HOSPADM

## 2020-12-12 RX ADMIN — INSULIN LISPRO 1 UNITS: 100 INJECTION, SOLUTION INTRAVENOUS; SUBCUTANEOUS at 00:59

## 2020-12-12 RX ADMIN — ENOXAPARIN SODIUM 40 MG: 40 INJECTION SUBCUTANEOUS at 19:51

## 2020-12-12 RX ADMIN — Medication 400 MG: at 08:20

## 2020-12-12 RX ADMIN — INSULIN LISPRO 1 UNITS: 100 INJECTION, SOLUTION INTRAVENOUS; SUBCUTANEOUS at 09:20

## 2020-12-12 RX ADMIN — Medication 6000 UNITS: at 00:56

## 2020-12-12 RX ADMIN — Medication 10 ML: at 19:51

## 2020-12-12 RX ADMIN — REMDESIVIR 200 MG: 100 INJECTION, POWDER, LYOPHILIZED, FOR SOLUTION INTRAVENOUS at 00:41

## 2020-12-12 RX ADMIN — DEXAMETHASONE 6 MG: 4 TABLET ORAL at 08:20

## 2020-12-12 RX ADMIN — CARVEDILOL 12.5 MG: 12.5 TABLET, FILM COATED ORAL at 19:51

## 2020-12-12 RX ADMIN — Medication 6000 UNITS: at 08:20

## 2020-12-12 RX ADMIN — CARVEDILOL 12.5 MG: 12.5 TABLET, FILM COATED ORAL at 08:19

## 2020-12-12 RX ADMIN — FUROSEMIDE 20 MG: 40 TABLET ORAL at 08:19

## 2020-12-12 RX ADMIN — SPIRONOLACTONE 25 MG: 25 TABLET, FILM COATED ORAL at 08:19

## 2020-12-12 RX ADMIN — Medication 10 ML: at 08:21

## 2020-12-12 RX ADMIN — FUROSEMIDE 20 MG: 40 TABLET ORAL at 00:26

## 2020-12-12 RX ADMIN — FUROSEMIDE 20 MG: 40 TABLET ORAL at 19:51

## 2020-12-12 RX ADMIN — ENOXAPARIN SODIUM 40 MG: 40 INJECTION SUBCUTANEOUS at 00:25

## 2020-12-12 RX ADMIN — CARVEDILOL 12.5 MG: 12.5 TABLET, FILM COATED ORAL at 00:26

## 2020-12-12 RX ADMIN — Medication 10 ML: at 00:26

## 2020-12-12 RX ADMIN — ENOXAPARIN SODIUM 40 MG: 40 INJECTION SUBCUTANEOUS at 08:21

## 2020-12-12 RX ADMIN — DEXAMETHASONE 6 MG: 4 TABLET ORAL at 00:53

## 2020-12-12 RX ADMIN — INSULIN LISPRO 2 UNITS: 100 INJECTION, SOLUTION INTRAVENOUS; SUBCUTANEOUS at 13:30

## 2020-12-12 RX ADMIN — INSULIN LISPRO 1 UNITS: 100 INJECTION, SOLUTION INTRAVENOUS; SUBCUTANEOUS at 20:38

## 2020-12-12 RX ADMIN — ATORVASTATIN CALCIUM 40 MG: 40 TABLET, FILM COATED ORAL at 00:25

## 2020-12-12 RX ADMIN — INSULIN LISPRO 1 UNITS: 100 INJECTION, SOLUTION INTRAVENOUS; SUBCUTANEOUS at 18:51

## 2020-12-12 RX ADMIN — REMDESIVIR 100 MG: 100 INJECTION, POWDER, LYOPHILIZED, FOR SOLUTION INTRAVENOUS at 22:48

## 2020-12-12 RX ADMIN — INSULIN GLARGINE 10 UNITS: 100 INJECTION, SOLUTION SUBCUTANEOUS at 20:39

## 2020-12-12 RX ADMIN — MULTIPLE VITAMINS W/ MINERALS TAB 1 TABLET: TAB at 08:19

## 2020-12-12 RX ADMIN — OXYCODONE HYDROCHLORIDE AND ACETAMINOPHEN 750 MG: 500 TABLET ORAL at 08:19

## 2020-12-12 RX ADMIN — ATORVASTATIN CALCIUM 40 MG: 40 TABLET, FILM COATED ORAL at 19:51

## 2020-12-12 ASSESSMENT — PAIN SCALES - GENERAL: PAINLEVEL_OUTOF10: 0

## 2020-12-12 NOTE — PLAN OF CARE
Problem: Airway Clearance - Ineffective  Goal: Achieve or maintain patent airway  Outcome: Ongoing   Pt able to maintain respiration between 16-18 beats per minute, oxygen saturation greater then 90% with supplemental oxygen. Pt sleeping comfortably in bed . Will continue to monitor.       Problem: Isolation Precautions - Risk of Spread of Infection  Goal: Prevent transmission of infection  Outcome: Ongoing     Pt remains in droplet isolation , education provided frequent hand washing and the need to use mask if he needs to be transported to another part of the hospital.

## 2020-12-12 NOTE — ED NOTES
Attempted to call report. Patient's RN unavailable. Spoke with Charge RN and patient's RN will call back.      Gloria Maria RN  12/11/20 2005

## 2020-12-12 NOTE — PROGRESS NOTES
Hospitalist Progress Note      PCP: Antoinette Perdue DO    Date of Admission: 2020    Chief Complaint on Admission: shortness of breath    Pt Seen/Examined and Chart Reviewed. Admitting dx COVID 19 PNA    SUBJECTIVE/OBJECTIVE:   The patient is a 77 y.o. male with medical history JOY, CHF, obesity with BMI of 38, who presented to [facility] with worsening shortness of breath. SARSCoV2 positive from 2020. ER course: O2 saturation was 88% on room air, CXR- multifocal infiltrates consistent with COVID 19 pneumonia, , procal 0.14, Ddimer 639, proBNP 332. Admitted and started on decadron and remdesivir. Today: Tmax 98.7, patient is on 2 liters O2 with low normal saturation. Labs reviewed- CRP and Ddimer are slightly improved. Patient reports feeling better, less body aches, chills, no headache, still with dyspnea. Called and updated wife Danni Galaviz. Allergies  Propofol    Medications      Scheduled Meds:   vitamin C  750 mg Oral Daily    atorvastatin  40 mg Oral Nightly    carvedilol  12.5 mg Oral BID    furosemide  20 mg Oral BID    magnesium oxide  400 mg Oral Daily    therapeutic multivitamin-minerals  1 tablet Oral Daily    spironolactone  25 mg Oral Daily    enoxaparin  40 mg Subcutaneous BID    dexamethasone  6 mg Oral Daily    Vitamin D  6,000 Units Oral Daily    sodium chloride flush  10 mL Intravenous 2 times per day    insulin lispro  0-6 Units Subcutaneous TID WC    insulin lispro  0-3 Units Subcutaneous Nightly    remdesivir IVPB  100 mg Intravenous Q24H       Infusions:      PRN Meds:  promethazine-codeine, acetaminophen **OR** acetaminophen, sodium chloride flush, polyethylene glycol, promethazine **OR** ondansetron, benzonatate, sodium chloride    Vitals    TEMPERATURE:  Current - Temp: 98 °F (36.7 °C);  Max - Temp  Av.2 °F (36.8 °C)  Min: 97.7 °F (36.5 °C)  Max: 98.7 °F (37.1 °C)  RESPIRATIONS RANGE: Resp  Av.2  Min: 18  Max: 24 PULSE RANGE: Pulse  Av.3  Min: 62  Max: 89  BLOOD PRESSURE RANGE:  Systolic (19AKZ), QVX:092 , Min:125 , UXF:409   ; Diastolic (10AXE), QKP:57, Min:70, Max:89    PULSE OXIMETRY RANGE: SpO2  Av.3 %  Min: 90 %  Max: 94 %  24HR INTAKE/OUTPUT:      Intake/Output Summary (Last 24 hours) at 20200  Last data filed at 2020 1897  Gross per 24 hour   Intake 240 ml   Output 830 ml   Net -590 ml       Exam:      General appearance: No apparent distress, appears stated age and cooperative. Lungs: improved air entry without crackles  Heart: Regular rate and rhythm with Normal S1/S2 without  murmurs, rubs or gallops, point of maximum impulse non-displaced  Abdomen: Soft, non-tender or non-distended without rigidity or guarding and positive bowel sounds all four quadrants. Extremities: No clubbing, cyanosis, trace to 1+ edema LE bilaterally. Skin: Skin color, texture, turgor normal.    Neurologic: Alert and oriented X 3,   grossly non-focal.  Mental status: Alert, oriented, thought content appropriate. Data    Recent Labs     20  1428 20  1720 20  0522   WBC 11.7* 12.0* 6.3   HGB 16.3 16.0 15.0   HCT 48.3 47.3 44.1    190 178      Recent Labs     20  1428 20  1720 20  0522   * 134* 135*   K 4.2 4.2 3.9   CL 98* 99 101   CO2 22 21 21   BUN 19 19 20   CREATININE 1.2 1.2 0.9     Recent Labs     20  1720 20  0522   AST 51* 43*   ALT 65* 69*   BILIDIR  --  <0.2   BILITOT 0.7 0.5   ALKPHOS 78 68     No results for input(s): INR in the last 72 hours.   Recent Labs     20  1428   TROPONINI <0.01       Consults:     IP CONSULT TO HOSPITALIST  IP CONSULT TO PHARMACY    Active Hospital Problems    Diagnosis Date Noted    Pneumonia due to COVID-19 virus [U07.1, J12.89] 2020         ASSESSMENT AND PLAN      COVID 19 pneumonia with new O2 requirement:  SARSCoV2 positive from 20  Decadron, remdesivir  Day 2 Follow inflammatory markers serially- improving  lovenox 40 mg BID  Symptomatic management for cough     JOY:  Consulted RT for CPAP nightly but may not be able to get as COVID positive.  Encourage IS during the day.      Chronic diast CHF, h/o severe MR s/p MVRepair in 2016, h/o post operative a flutter not on long term anticoagulation:  Continue with lasix, aldactone, last EF 55% from 2017  Cont coreg          DVT Prophylaxis: lovenox  Diet: DIET CARDIAC; Carb Control: 4 carb choices (60 gms)/meal  Code Status: Full Code    PT/OT Eval Status:at baseline    Dispo - Ariane Silver MD

## 2020-12-12 NOTE — ED NOTES
Report called to Mitch Nieto RN on PCU. Patient will be transported shortly.      Alberto Vallejo RN  12/11/20 2012

## 2020-12-12 NOTE — PROGRESS NOTES
4 Eyes Admission Assessment     I agree as the admission nurse that 2 RN's have performed a thorough Head to Toe Skin Assessment on the patient. ALL assessment sites listed below have been assessed on admission. Areas assessed by both nurses:   []   Head, Face, and Ears   []   Shoulders, Back, and Chest  []   Arms, Elbows, and Hands   []   Coccyx, Sacrum, and Ischium  []   Legs, Feet, and Heels        Does the Patient have Skin Breakdown?   No         Luc Prevention initiated:  No   Wound Care Orders initiated:  No      [facility] nurse consulted for Pressure Injury (Stage 3,4, Unstageable, DTI, NWPT, and Complex wounds) or Luc score 18 or lower:  No      Nurse 1 eSignature: Electronically signed by Jaspal Miller RN on 12/12/2020 at 5:05 AM    **SHARE this note so that the co-signing nurse is able to place an eSignature**    Nurse 2 eSignature: Electronically signed by Maryam Bullard RN on 12/12/20 at 6:43 AM EST

## 2020-12-13 LAB
A/G RATIO: 1.1 (ref 1.1–2.2)
ALBUMIN SERPL-MCNC: 3.3 G/DL (ref 3.4–5)
ALP BLD-CCNC: 70 U/L (ref 40–129)
ALT SERPL-CCNC: 101 U/L (ref 10–40)
ANION GAP SERPL CALCULATED.3IONS-SCNC: 11 MMOL/L (ref 3–16)
AST SERPL-CCNC: 49 U/L (ref 15–37)
BASOPHILS ABSOLUTE: 0 K/UL (ref 0–0.2)
BASOPHILS RELATIVE PERCENT: 0 %
BILIRUB SERPL-MCNC: 0.4 MG/DL (ref 0–1)
BILIRUBIN DIRECT: <0.2 MG/DL (ref 0–0.3)
BILIRUBIN, INDIRECT: NORMAL MG/DL (ref 0–1)
BUN BLDV-MCNC: 23 MG/DL (ref 7–20)
C-REACTIVE PROTEIN: 41.6 MG/L (ref 0–5.1)
CALCIUM SERPL-MCNC: 9 MG/DL (ref 8.3–10.6)
CHLORIDE BLD-SCNC: 104 MMOL/L (ref 99–110)
CO2: 22 MMOL/L (ref 21–32)
CREAT SERPL-MCNC: 0.9 MG/DL (ref 0.8–1.3)
D DIMER: 260 NG/ML DDU (ref 0–229)
EOSINOPHILS ABSOLUTE: 0 K/UL (ref 0–0.6)
EOSINOPHILS RELATIVE PERCENT: 0 %
GFR AFRICAN AMERICAN: >60
GFR NON-AFRICAN AMERICAN: >60
GLOBULIN: 2.9 G/DL
GLUCOSE BLD-MCNC: 158 MG/DL (ref 70–99)
GLUCOSE BLD-MCNC: 160 MG/DL (ref 70–99)
GLUCOSE BLD-MCNC: 169 MG/DL (ref 70–99)
GLUCOSE BLD-MCNC: 177 MG/DL (ref 70–99)
GLUCOSE BLD-MCNC: 195 MG/DL (ref 70–99)
HCT VFR BLD CALC: 45.1 % (ref 40.5–52.5)
HEMOGLOBIN: 14.9 G/DL (ref 13.5–17.5)
LYMPHOCYTES ABSOLUTE: 0.9 K/UL (ref 1–5.1)
LYMPHOCYTES RELATIVE PERCENT: 5.6 %
MCH RBC QN AUTO: 29.4 PG (ref 26–34)
MCHC RBC AUTO-ENTMCNC: 33.1 G/DL (ref 31–36)
MCV RBC AUTO: 89 FL (ref 80–100)
MONOCYTES ABSOLUTE: 0.8 K/UL (ref 0–1.3)
MONOCYTES RELATIVE PERCENT: 5.2 %
NEUTROPHILS ABSOLUTE: 13.6 K/UL (ref 1.7–7.7)
NEUTROPHILS RELATIVE PERCENT: 89.2 %
PDW BLD-RTO: 13 % (ref 12.4–15.4)
PERFORMED ON: ABNORMAL
PLATELET # BLD: 210 K/UL (ref 135–450)
PMV BLD AUTO: 9.9 FL (ref 5–10.5)
POTASSIUM REFLEX MAGNESIUM: 4.2 MMOL/L (ref 3.5–5.1)
RBC # BLD: 5.07 M/UL (ref 4.2–5.9)
SODIUM BLD-SCNC: 137 MMOL/L (ref 136–145)
TOTAL PROTEIN: 6.2 G/DL (ref 6.4–8.2)
WBC # BLD: 15.3 K/UL (ref 4–11)

## 2020-12-13 PROCEDURE — 1200000000 HC SEMI PRIVATE

## 2020-12-13 PROCEDURE — 36415 COLL VENOUS BLD VENIPUNCTURE: CPT

## 2020-12-13 PROCEDURE — 94761 N-INVAS EAR/PLS OXIMETRY MLT: CPT

## 2020-12-13 PROCEDURE — 6370000000 HC RX 637 (ALT 250 FOR IP): Performed by: INTERNAL MEDICINE

## 2020-12-13 PROCEDURE — 2700000000 HC OXYGEN THERAPY PER DAY

## 2020-12-13 PROCEDURE — 80053 COMPREHEN METABOLIC PANEL: CPT

## 2020-12-13 PROCEDURE — 86140 C-REACTIVE PROTEIN: CPT

## 2020-12-13 PROCEDURE — 2580000003 HC RX 258: Performed by: INTERNAL MEDICINE

## 2020-12-13 PROCEDURE — 85379 FIBRIN DEGRADATION QUANT: CPT

## 2020-12-13 PROCEDURE — 6360000002 HC RX W HCPCS: Performed by: INTERNAL MEDICINE

## 2020-12-13 PROCEDURE — 2500000003 HC RX 250 WO HCPCS: Performed by: INTERNAL MEDICINE

## 2020-12-13 PROCEDURE — 85025 COMPLETE CBC W/AUTO DIFF WBC: CPT

## 2020-12-13 RX ADMIN — INSULIN LISPRO 1 UNITS: 100 INJECTION, SOLUTION INTRAVENOUS; SUBCUTANEOUS at 21:51

## 2020-12-13 RX ADMIN — INSULIN LISPRO 1 UNITS: 100 INJECTION, SOLUTION INTRAVENOUS; SUBCUTANEOUS at 18:24

## 2020-12-13 RX ADMIN — ATORVASTATIN CALCIUM 40 MG: 40 TABLET, FILM COATED ORAL at 21:51

## 2020-12-13 RX ADMIN — ENOXAPARIN SODIUM 40 MG: 40 INJECTION SUBCUTANEOUS at 09:23

## 2020-12-13 RX ADMIN — FUROSEMIDE 20 MG: 40 TABLET ORAL at 09:22

## 2020-12-13 RX ADMIN — INSULIN LISPRO 1 UNITS: 100 INJECTION, SOLUTION INTRAVENOUS; SUBCUTANEOUS at 13:10

## 2020-12-13 RX ADMIN — REMDESIVIR 100 MG: 100 INJECTION, POWDER, LYOPHILIZED, FOR SOLUTION INTRAVENOUS at 18:23

## 2020-12-13 RX ADMIN — Medication 400 MG: at 09:23

## 2020-12-13 RX ADMIN — ENOXAPARIN SODIUM 40 MG: 40 INJECTION SUBCUTANEOUS at 21:51

## 2020-12-13 RX ADMIN — Medication 10 ML: at 09:27

## 2020-12-13 RX ADMIN — Medication 10 ML: at 21:51

## 2020-12-13 RX ADMIN — INSULIN GLARGINE 10 UNITS: 100 INJECTION, SOLUTION SUBCUTANEOUS at 21:51

## 2020-12-13 RX ADMIN — CARVEDILOL 12.5 MG: 12.5 TABLET, FILM COATED ORAL at 09:22

## 2020-12-13 RX ADMIN — OXYCODONE HYDROCHLORIDE AND ACETAMINOPHEN 750 MG: 500 TABLET ORAL at 09:22

## 2020-12-13 RX ADMIN — FUROSEMIDE 20 MG: 40 TABLET ORAL at 21:51

## 2020-12-13 RX ADMIN — Medication 6000 UNITS: at 09:22

## 2020-12-13 RX ADMIN — DEXAMETHASONE 6 MG: 4 TABLET ORAL at 09:23

## 2020-12-13 RX ADMIN — SPIRONOLACTONE 25 MG: 25 TABLET, FILM COATED ORAL at 09:23

## 2020-12-13 RX ADMIN — MULTIPLE VITAMINS W/ MINERALS TAB 1 TABLET: TAB at 09:23

## 2020-12-13 RX ADMIN — INSULIN LISPRO 1 UNITS: 100 INJECTION, SOLUTION INTRAVENOUS; SUBCUTANEOUS at 09:20

## 2020-12-13 ASSESSMENT — PAIN SCALES - GENERAL: PAINLEVEL_OUTOF10: 0

## 2020-12-13 NOTE — PROGRESS NOTES
Hospitalist Progress Note      PCP: Nova Drafts, DO    Date of Admission: 2020    Chief Complaint on Admission: shortness of breath    Pt Seen/Examined and Chart Reviewed. Admitting dx COVID 19 PNA    SUBJECTIVE/OBJECTIVE:   The patient is a 77 y.o. male with medical history JOY, CHF, obesity with BMI of 38, who presented to [facility] with worsening shortness of breath. SARSCoV2 positive from 2020. ER course: O2 saturation was 88% on room air, CXR- multifocal infiltrates consistent with COVID 19 pneumonia, , procal 0.14, Ddimer 639, proBNP 332. Admitted and started on decadron and remdesivir. Today: Tmax 98. 0. remains on 2 liters with low normal sats. CRP and Ddimer down, ALT/AST up a bit. Feels better, no headache, tolerating PO. Allergies  Propofol    Medications      Scheduled Meds:   insulin glargine  10 Units Subcutaneous Nightly    vitamin C  750 mg Oral Daily    atorvastatin  40 mg Oral Nightly    carvedilol  12.5 mg Oral BID    furosemide  20 mg Oral BID    magnesium oxide  400 mg Oral Daily    therapeutic multivitamin-minerals  1 tablet Oral Daily    spironolactone  25 mg Oral Daily    enoxaparin  40 mg Subcutaneous BID    dexamethasone  6 mg Oral Daily    Vitamin D  6,000 Units Oral Daily    sodium chloride flush  10 mL Intravenous 2 times per day    insulin lispro  0-6 Units Subcutaneous TID WC    insulin lispro  0-3 Units Subcutaneous Nightly    remdesivir IVPB  100 mg Intravenous Q24H       Infusions:   dextrose         PRN Meds:  glucose, dextrose, glucagon (rDNA), dextrose, promethazine-codeine, acetaminophen **OR** acetaminophen, sodium chloride flush, polyethylene glycol, promethazine **OR** ondansetron, benzonatate, sodium chloride    Vitals    TEMPERATURE:  Current - Temp: 97 °F (36.1 °C);  Max - Temp  Av.8 °F (36.6 °C)  Min: 97 °F (36.1 °C)  Max: 98 °F (36.7 °C)  RESPIRATIONS RANGE: Resp  Av.3  Min: 18  Max: 20 PULSE RANGE: Pulse  Av.6  Min: 55  Max: 65  BLOOD PRESSURE RANGE:  Systolic (80YAX), HPZ:683 , Min:125 , SEA:527   ; Diastolic (11MEJ), XTN:26, Min:70, Max:87    PULSE OXIMETRY RANGE: SpO2  Av.6 %  Min: 92 %  Max: 93 %  24HR INTAKE/OUTPUT:      Intake/Output Summary (Last 24 hours) at 2020 0749  Last data filed at 2020 0300  Gross per 24 hour   Intake 600 ml   Output 350 ml   Net 250 ml       Exam:      General appearance: No apparent distress, appears stated age and cooperative. Lungs: improved air entry without crackles  Heart: Regular rate and rhythm with Normal S1/S2 without  murmurs, rubs or gallops, point of maximum impulse non-displaced  Abdomen: Soft, non-tender or non-distended without rigidity or guarding and positive bowel sounds all four quadrants. Extremities: No clubbing, cyanosis, trace to 1+ edema LE bilaterally, stable. Skin: Skin color, texture, turgor normal.    Neurologic: Alert and oriented X 3,   grossly non-focal.  Mental status: Alert, oriented, thought content appropriate. Data    Recent Labs     20  1720 20  0522 20  0458   WBC 12.0* 6.3 15.3*   HGB 16.0 15.0 14.9   HCT 47.3 44.1 45.1    178 210      Recent Labs     20  1720 20  0522 20  0458   * 135* 137   K 4.2 3.9 4.2   CL 99 101 104   CO2 21 21 22   BUN 19 20 23*   CREATININE 1.2 0.9 0.9     Recent Labs     20  1720 20  0522 20  0458   AST 51* 43* 49*   ALT 65* 69* 101*   BILIDIR  --  <0.2 <0.2   BILITOT 0.7 0.5 0.4   ALKPHOS 78 68 70     No results for input(s): INR in the last 72 hours.   Recent Labs     20  1428   TROPONINI <0.01       Consults:     IP CONSULT TO HOSPITALIST  IP CONSULT TO Lanette Esparzaas 1428 Problems    Diagnosis Date Noted    Pneumonia due to COVID-19 virus [U07.1, J12.89] 2020         ASSESSMENT AND PLAN      COVID 19 pneumonia with new O2 requirement:  SARSCoV2 positive from 20 Decadron, remdesivir  Day 3  Follow inflammatory markers serially- improving. LFTs noted- monitor. lovenox 40 mg BID  Symptomatic management for cough     JOY:  Consulted RT for CPAP nightly but not be able to get as COVID positive. Encourage IS during the day.      Chronic diast CHF, h/o severe MR s/p MVRepair in 2016, h/o post operative a flutter not on long term anticoagulation:  Continue with lasix, aldactone, last EF 55% from 2017  Cont coreg    Steroid induced hyperglycemia:  ISS, low dose lantus.           DVT Prophylaxis: lovenox  Diet: DIET CARDIAC; Carb Control: 4 carb choices (60 gms)/meal  Code Status: Full Code    PT/OT Eval Status:at baseline    Dispo - inpt    Tejal Roy MD

## 2020-12-13 NOTE — PROGRESS NOTES
Pt alert and oriented. VSS. Pt denies pain. Pt resting comfortably in bed. Pt took evening medications without issue. Pt has call light within reach, bed in lowest position with wheels locked, 2/4 side rails up, and pt is up ad rosaline with steady gait. Will continue to monitor.

## 2020-12-13 NOTE — PLAN OF CARE
Problem: Falls - Risk of:  Goal: Will remain free from falls  Description: Will remain free from falls  Outcome: Ongoing   Pt has non skid socks on, call light within reach, bed in lowest position with wheels locked, 2/4 side rails up, and pt is up ad rosaline with steady gait.

## 2020-12-13 NOTE — PLAN OF CARE
Problem: Airway Clearance - Ineffective  Goal: Achieve or maintain patent airway  Outcome: Ongoing     Problem: Gas Exchange - Impaired  Goal: Absence of hypoxia  Outcome: Ongoing     Problem: Gas Exchange - Impaired  Goal: Promote optimal lung function  Outcome: Ongoing   Patients oxygen saturation remaining 90% and above this shift, Patient remains on 2 liters due to SOB at rest and with exertion

## 2020-12-14 LAB
A/G RATIO: 1.2 (ref 1.1–2.2)
ALBUMIN SERPL-MCNC: 3.3 G/DL (ref 3.4–5)
ALP BLD-CCNC: 74 U/L (ref 40–129)
ALT SERPL-CCNC: 111 U/L (ref 10–40)
ANION GAP SERPL CALCULATED.3IONS-SCNC: 10 MMOL/L (ref 3–16)
AST SERPL-CCNC: 36 U/L (ref 15–37)
BASOPHILS ABSOLUTE: 0 K/UL (ref 0–0.2)
BASOPHILS RELATIVE PERCENT: 0 %
BILIRUB SERPL-MCNC: 0.4 MG/DL (ref 0–1)
BILIRUBIN DIRECT: <0.2 MG/DL (ref 0–0.3)
BILIRUBIN, INDIRECT: NORMAL MG/DL (ref 0–1)
BUN BLDV-MCNC: 22 MG/DL (ref 7–20)
C-REACTIVE PROTEIN: 18.3 MG/L (ref 0–5.1)
CALCIUM SERPL-MCNC: 9 MG/DL (ref 8.3–10.6)
CHLORIDE BLD-SCNC: 104 MMOL/L (ref 99–110)
CO2: 22 MMOL/L (ref 21–32)
CREAT SERPL-MCNC: 0.9 MG/DL (ref 0.8–1.3)
D DIMER: 202 NG/ML DDU (ref 0–229)
EOSINOPHILS ABSOLUTE: 0 K/UL (ref 0–0.6)
EOSINOPHILS RELATIVE PERCENT: 0 %
GFR AFRICAN AMERICAN: >60
GFR NON-AFRICAN AMERICAN: >60
GLOBULIN: 2.8 G/DL
GLUCOSE BLD-MCNC: 132 MG/DL (ref 70–99)
GLUCOSE BLD-MCNC: 142 MG/DL (ref 70–99)
GLUCOSE BLD-MCNC: 153 MG/DL (ref 70–99)
GLUCOSE BLD-MCNC: 178 MG/DL (ref 70–99)
GLUCOSE BLD-MCNC: 260 MG/DL (ref 70–99)
HCT VFR BLD CALC: 45.3 % (ref 40.5–52.5)
HEMOGLOBIN: 15.3 G/DL (ref 13.5–17.5)
LYMPHOCYTES ABSOLUTE: 0.6 K/UL (ref 1–5.1)
LYMPHOCYTES RELATIVE PERCENT: 3.7 %
MCH RBC QN AUTO: 30 PG (ref 26–34)
MCHC RBC AUTO-ENTMCNC: 33.8 G/DL (ref 31–36)
MCV RBC AUTO: 89 FL (ref 80–100)
MONOCYTES ABSOLUTE: 0.9 K/UL (ref 0–1.3)
MONOCYTES RELATIVE PERCENT: 5.4 %
NEUTROPHILS ABSOLUTE: 15.3 K/UL (ref 1.7–7.7)
NEUTROPHILS RELATIVE PERCENT: 90.9 %
PDW BLD-RTO: 13.3 % (ref 12.4–15.4)
PERFORMED ON: ABNORMAL
PLATELET # BLD: 251 K/UL (ref 135–450)
PMV BLD AUTO: 9.9 FL (ref 5–10.5)
POTASSIUM REFLEX MAGNESIUM: 4.2 MMOL/L (ref 3.5–5.1)
RBC # BLD: 5.09 M/UL (ref 4.2–5.9)
SODIUM BLD-SCNC: 136 MMOL/L (ref 136–145)
TOTAL PROTEIN: 6.1 G/DL (ref 6.4–8.2)
WBC # BLD: 16.9 K/UL (ref 4–11)

## 2020-12-14 PROCEDURE — 85025 COMPLETE CBC W/AUTO DIFF WBC: CPT

## 2020-12-14 PROCEDURE — 36415 COLL VENOUS BLD VENIPUNCTURE: CPT

## 2020-12-14 PROCEDURE — 2500000003 HC RX 250 WO HCPCS: Performed by: INTERNAL MEDICINE

## 2020-12-14 PROCEDURE — 86140 C-REACTIVE PROTEIN: CPT

## 2020-12-14 PROCEDURE — 6370000000 HC RX 637 (ALT 250 FOR IP): Performed by: INTERNAL MEDICINE

## 2020-12-14 PROCEDURE — 2580000003 HC RX 258: Performed by: INTERNAL MEDICINE

## 2020-12-14 PROCEDURE — 6360000002 HC RX W HCPCS: Performed by: INTERNAL MEDICINE

## 2020-12-14 PROCEDURE — 85379 FIBRIN DEGRADATION QUANT: CPT

## 2020-12-14 PROCEDURE — 80053 COMPREHEN METABOLIC PANEL: CPT

## 2020-12-14 PROCEDURE — 1200000000 HC SEMI PRIVATE

## 2020-12-14 RX ORDER — CARVEDILOL 6.25 MG/1
6.25 TABLET ORAL 2 TIMES DAILY
Status: DISCONTINUED | OUTPATIENT
Start: 2020-12-14 | End: 2020-12-15 | Stop reason: HOSPADM

## 2020-12-14 RX ADMIN — Medication 6000 UNITS: at 09:21

## 2020-12-14 RX ADMIN — FUROSEMIDE 20 MG: 40 TABLET ORAL at 09:20

## 2020-12-14 RX ADMIN — ENOXAPARIN SODIUM 40 MG: 40 INJECTION SUBCUTANEOUS at 09:20

## 2020-12-14 RX ADMIN — ENOXAPARIN SODIUM 40 MG: 40 INJECTION SUBCUTANEOUS at 21:17

## 2020-12-14 RX ADMIN — INSULIN GLARGINE 10 UNITS: 100 INJECTION, SOLUTION SUBCUTANEOUS at 21:18

## 2020-12-14 RX ADMIN — MULTIPLE VITAMINS W/ MINERALS TAB 1 TABLET: TAB at 09:20

## 2020-12-14 RX ADMIN — FUROSEMIDE 20 MG: 40 TABLET ORAL at 21:17

## 2020-12-14 RX ADMIN — Medication 400 MG: at 09:21

## 2020-12-14 RX ADMIN — Medication 10 ML: at 09:21

## 2020-12-14 RX ADMIN — DEXAMETHASONE 6 MG: 4 TABLET ORAL at 09:21

## 2020-12-14 RX ADMIN — INSULIN LISPRO 2 UNITS: 100 INJECTION, SOLUTION INTRAVENOUS; SUBCUTANEOUS at 21:18

## 2020-12-14 RX ADMIN — REMDESIVIR 100 MG: 100 INJECTION, POWDER, LYOPHILIZED, FOR SOLUTION INTRAVENOUS at 18:10

## 2020-12-14 RX ADMIN — ATORVASTATIN CALCIUM 40 MG: 40 TABLET, FILM COATED ORAL at 21:17

## 2020-12-14 RX ADMIN — SPIRONOLACTONE 25 MG: 25 TABLET, FILM COATED ORAL at 09:21

## 2020-12-14 RX ADMIN — OXYCODONE HYDROCHLORIDE AND ACETAMINOPHEN 750 MG: 500 TABLET ORAL at 09:21

## 2020-12-14 RX ADMIN — INSULIN LISPRO 1 UNITS: 100 INJECTION, SOLUTION INTRAVENOUS; SUBCUTANEOUS at 18:11

## 2020-12-14 RX ADMIN — Medication 10 ML: at 21:17

## 2020-12-14 ASSESSMENT — PAIN SCALES - GENERAL
PAINLEVEL_OUTOF10: 0

## 2020-12-14 NOTE — PLAN OF CARE
Problem: Gas Exchange - Impaired  Goal: Absence of hypoxia  Outcome: Ongoing  Note: Pt on 2L of O2 with oxygen saturations in the 90's. No SOB with rest. Slight SOB with exertion.  Will continue to monitor

## 2020-12-14 NOTE — RESULT ENCOUNTER NOTE
Pt in hospital d/t Covid-19. Your test came back detected/positive for Covid-19. What happens if I have a positive test?  If you have symptoms:  Isolate until all three of these things are true: 1) your symptoms are better, 2) it has been 10 days since you first felt sick, and 3) you have had no fever for at least 24 hours without using medicine that lowers fever. Drink plenty of fluids and eat when you can. You may take medicine for pain or fever if you need to. Rest as much as you can. If you do not have symptoms:  Stay home for 10 days after the date you were tested. If you develop symptoms during those 10 days, stay home until all three of these things are true: 1) your symptoms are better, 2) it has been 10 days since you first felt sick, and 3) you have had no fever for at least 24 hours without using medicine that lowers fever. Follow care instructions from your doctor or other healthcare provider. Seek emergency medical care immediately if you have trouble breathing, persistent pain or pressure in the chest, new confusion, inability to wake or stay awake, or bluish lips or face. Someone from the health department (case investigator or contact tracer) may reach out to you to check on your health and ask about other people you have been around or where you've spent time while you may have been able to spread COVID-19 to others. This person's role is strictly to map the virus to help identify people who may have been exposed to the virus and prevent its spread. The local health department will also provide guidance on how to stay safely at home to avoid spreading illness. Detected results can happen for months and you are not considered contagious. No retest is necessary.

## 2020-12-14 NOTE — PROGRESS NOTES
Hospitalist Progress Note      PCP: Violette Snow DO    Date of Admission: 2020    Chief Complaint on Admission: shortness of breath    Pt Seen/Examined and Chart Reviewed. Admitting dx COVID 19 PNA    SUBJECTIVE/OBJECTIVE:   The patient is a 77 y.o. male with medical history JOY, CHF, obesity with BMI of 38, who presented to [facility] with worsening shortness of breath. SARSCoV2 positive from 2020. ER course: O2 saturation was 88% on room air, CXR- multifocal infiltrates consistent with COVID 19 pneumonia, , procal 0.14, Ddimer 639, proBNP 332. Admitted and started on decadron and remdesivir. Today: afebrile, less chills, no headache, breathing is better. Tolerating PO. Called and updated wife Vivian Rangel. Allergies  Propofol    Medications      Scheduled Meds:   insulin glargine  10 Units Subcutaneous Nightly    vitamin C  750 mg Oral Daily    atorvastatin  40 mg Oral Nightly    carvedilol  12.5 mg Oral BID    furosemide  20 mg Oral BID    magnesium oxide  400 mg Oral Daily    therapeutic multivitamin-minerals  1 tablet Oral Daily    spironolactone  25 mg Oral Daily    enoxaparin  40 mg Subcutaneous BID    dexamethasone  6 mg Oral Daily    Vitamin D  6,000 Units Oral Daily    sodium chloride flush  10 mL Intravenous 2 times per day    insulin lispro  0-6 Units Subcutaneous TID WC    insulin lispro  0-3 Units Subcutaneous Nightly    remdesivir IVPB  100 mg Intravenous Q24H       Infusions:   dextrose         PRN Meds:  glucose, dextrose, glucagon (rDNA), dextrose, promethazine-codeine, acetaminophen **OR** acetaminophen, sodium chloride flush, polyethylene glycol, promethazine **OR** ondansetron, benzonatate, sodium chloride    Vitals    TEMPERATURE:  Current - Temp: 97.7 °F (36.5 °C);  Max - Temp  Av.8 °F (36.6 °C)  Min: 97.3 °F (36.3 °C)  Max: 98.5 °F (36.9 °C)  RESPIRATIONS RANGE: Resp  Av.2  Min: 16  Max: 18 PULSE RANGE: Pulse  Av.4  Min: 56  Max: 69  BLOOD PRESSURE RANGE:  Systolic (36GPO), FZS:084 , Min:122 , IYE:360   ; Diastolic (12AJQ), JKU:03, Min:72, Max:85    PULSE OXIMETRY RANGE: SpO2  Av.7 %  Min: 92 %  Max: 94 %  24HR INTAKE/OUTPUT:      Intake/Output Summary (Last 24 hours) at 2020 1130  Last data filed at 2020 1955  Gross per 24 hour   Intake 480 ml   Output    Net 480 ml       Exam:      General appearance: No apparent distress, appears stated age and cooperative. Lungs: improved air entry without crackles  Heart: Regular rate and rhythm with Normal S1/S2 without  murmurs, rubs or gallops, point of maximum impulse non-displaced  Abdomen: Soft, non-tender or non-distended without rigidity or guarding and positive bowel sounds all four quadrants. Extremities: No clubbing, cyanosis, trace to 1+ edema LE bilaterally, stable. Skin: Skin color, texture, turgor normal.    Neurologic: Alert and oriented X 3,   grossly non-focal.  Mental status: Alert, oriented, thought content appropriate. Data    Recent Labs     208 20  0440   WBC 6.3 15.3* 16.9*   HGB 15.0 14.9 15.3   HCT 44.1 45.1 45.3    210 251      Recent Labs     20  05208 20  0440   * 137 136   K 3.9 4.2 4.2    104 104   CO2 21 22 22   BUN 20 23* 22*   CREATININE 0.9 0.9 0.9     Recent Labs     20  0520  0458 20  0440   AST 43* 49* 36   ALT 69* 101* 111*   BILIDIR <0.2 <0.2 <0.2   BILITOT 0.5 0.4 0.4   ALKPHOS 68 70 74     No results for input(s): INR in the last 72 hours.   Recent Labs     20  1428   TROPONINI <0.01       Consults:     IP CONSULT TO HOSPITALIST  IP CONSULT TO Lanette Beckman 1428 Problems    Diagnosis Date Noted    Pneumonia due to COVID-19 virus [U07.1, J12.89] 2020         ASSESSMENT AND PLAN      COVID 19 pneumonia with new O2 requirement:  SARSCoV2 positive from 20 Decadron, remdesivir  Day 4  Follow inflammatory markers serially- improving. LFTs noted- monitor- stable. lovenox 40 mg BID  Symptomatic management for cough  Recommended isolation is 20 days since the day of positive test - through 12/28/2020     JOY:  Consulted RT for CPAP nightly but not be able to get as COVID positive.  Encourage IS during the day.      Chronic diast CHF, h/o severe MR s/p MVRepair in 2016, h/o post operative a flutter not on long term anticoagulation:  Continue with lasix, aldactone, last EF 55% from 2017  Cont coreg    Steroid induced hyperglycemia:  ISS, low dose lantus          DVT Prophylaxis: lovenox  Diet: DIET CARDIAC; Carb Control: 4 carb choices (60 gms)/meal  Code Status: Full Code    PT/OT Eval Status:at baseline    [place] - Sonja Polk MD

## 2020-12-15 VITALS
OXYGEN SATURATION: 97 % | TEMPERATURE: 97.6 F | SYSTOLIC BLOOD PRESSURE: 133 MMHG | WEIGHT: 315 LBS | DIASTOLIC BLOOD PRESSURE: 83 MMHG | BODY MASS INDEX: 38.36 KG/M2 | RESPIRATION RATE: 18 BRPM | HEIGHT: 76 IN | HEART RATE: 64 BPM

## 2020-12-15 LAB
A/G RATIO: 1.2 (ref 1.1–2.2)
ALBUMIN SERPL-MCNC: 3.6 G/DL (ref 3.4–5)
ALP BLD-CCNC: 81 U/L (ref 40–129)
ALT SERPL-CCNC: 158 U/L (ref 10–40)
ANION GAP SERPL CALCULATED.3IONS-SCNC: 13 MMOL/L (ref 3–16)
AST SERPL-CCNC: 54 U/L (ref 15–37)
ATYPICAL LYMPHOCYTE RELATIVE PERCENT: 1 % (ref 0–6)
BASOPHILS ABSOLUTE: 0 K/UL (ref 0–0.2)
BASOPHILS RELATIVE PERCENT: 0 %
BILIRUB SERPL-MCNC: 0.5 MG/DL (ref 0–1)
BILIRUBIN DIRECT: <0.2 MG/DL (ref 0–0.3)
BILIRUBIN, INDIRECT: NORMAL MG/DL (ref 0–1)
BUN BLDV-MCNC: 19 MG/DL (ref 7–20)
C-REACTIVE PROTEIN: 11.4 MG/L (ref 0–5.1)
CALCIUM SERPL-MCNC: 9.4 MG/DL (ref 8.3–10.6)
CHLORIDE BLD-SCNC: 101 MMOL/L (ref 99–110)
CO2: 23 MMOL/L (ref 21–32)
CREAT SERPL-MCNC: 0.9 MG/DL (ref 0.8–1.3)
D DIMER: 214 NG/ML DDU (ref 0–229)
EOSINOPHILS ABSOLUTE: 0 K/UL (ref 0–0.6)
EOSINOPHILS RELATIVE PERCENT: 0 %
GFR AFRICAN AMERICAN: >60
GFR NON-AFRICAN AMERICAN: >60
GLOBULIN: 3 G/DL
GLUCOSE BLD-MCNC: 115 MG/DL (ref 70–99)
GLUCOSE BLD-MCNC: 143 MG/DL (ref 70–99)
HCT VFR BLD CALC: 49.3 % (ref 40.5–52.5)
HEMOGLOBIN: 16.8 G/DL (ref 13.5–17.5)
LYMPHOCYTES ABSOLUTE: 0.9 K/UL (ref 1–5.1)
LYMPHOCYTES RELATIVE PERCENT: 6 %
MCH RBC QN AUTO: 30.3 PG (ref 26–34)
MCHC RBC AUTO-ENTMCNC: 34.2 G/DL (ref 31–36)
MCV RBC AUTO: 88.6 FL (ref 80–100)
MONOCYTES ABSOLUTE: 0.9 K/UL (ref 0–1.3)
MONOCYTES RELATIVE PERCENT: 7 %
NEUTROPHILS ABSOLUTE: 10.8 K/UL (ref 1.7–7.7)
NEUTROPHILS RELATIVE PERCENT: 86 %
PDW BLD-RTO: 13 % (ref 12.4–15.4)
PERFORMED ON: ABNORMAL
PLATELET # BLD: 250 K/UL (ref 135–450)
PMV BLD AUTO: 10.1 FL (ref 5–10.5)
POTASSIUM REFLEX MAGNESIUM: 4.4 MMOL/L (ref 3.5–5.1)
RBC # BLD: 5.56 M/UL (ref 4.2–5.9)
RBC # BLD: NORMAL 10*6/UL
SODIUM BLD-SCNC: 137 MMOL/L (ref 136–145)
TOTAL PROTEIN: 6.6 G/DL (ref 6.4–8.2)
WBC # BLD: 12.6 K/UL (ref 4–11)

## 2020-12-15 PROCEDURE — 2580000003 HC RX 258: Performed by: INTERNAL MEDICINE

## 2020-12-15 PROCEDURE — 6370000000 HC RX 637 (ALT 250 FOR IP): Performed by: INTERNAL MEDICINE

## 2020-12-15 PROCEDURE — 94680 O2 UPTK RST&XERS DIR SIMPLE: CPT

## 2020-12-15 PROCEDURE — 80053 COMPREHEN METABOLIC PANEL: CPT

## 2020-12-15 PROCEDURE — 36415 COLL VENOUS BLD VENIPUNCTURE: CPT

## 2020-12-15 PROCEDURE — 6360000002 HC RX W HCPCS: Performed by: INTERNAL MEDICINE

## 2020-12-15 PROCEDURE — 86140 C-REACTIVE PROTEIN: CPT

## 2020-12-15 PROCEDURE — 85025 COMPLETE CBC W/AUTO DIFF WBC: CPT

## 2020-12-15 PROCEDURE — 85379 FIBRIN DEGRADATION QUANT: CPT

## 2020-12-15 RX ORDER — DEXAMETHASONE 6 MG/1
6 TABLET ORAL DAILY
Qty: 4 TABLET | Refills: 0 | Status: SHIPPED | OUTPATIENT
Start: 2020-12-16 | End: 2020-12-20

## 2020-12-15 RX ADMIN — CARVEDILOL 6.25 MG: 6.25 TABLET, FILM COATED ORAL at 08:18

## 2020-12-15 RX ADMIN — FUROSEMIDE 20 MG: 40 TABLET ORAL at 08:16

## 2020-12-15 RX ADMIN — Medication 10 ML: at 08:19

## 2020-12-15 RX ADMIN — OXYCODONE HYDROCHLORIDE AND ACETAMINOPHEN 750 MG: 500 TABLET ORAL at 08:18

## 2020-12-15 RX ADMIN — Medication 6000 UNITS: at 08:17

## 2020-12-15 RX ADMIN — ENOXAPARIN SODIUM 40 MG: 40 INJECTION SUBCUTANEOUS at 08:18

## 2020-12-15 RX ADMIN — MULTIPLE VITAMINS W/ MINERALS TAB 1 TABLET: TAB at 08:16

## 2020-12-15 RX ADMIN — Medication 400 MG: at 08:16

## 2020-12-15 RX ADMIN — DEXAMETHASONE 6 MG: 4 TABLET ORAL at 08:17

## 2020-12-15 RX ADMIN — SPIRONOLACTONE 25 MG: 25 TABLET, FILM COATED ORAL at 08:17

## 2020-12-15 ASSESSMENT — PAIN SCALES - GENERAL
PAINLEVEL_OUTOF10: 0

## 2020-12-15 NOTE — PROGRESS NOTES
Pt d/c to home with wife as transfort with all personal belongings. Pt taken down in wheelchair by this RN. AVS, Rx, and f/u appt reviewed prior to leaving. IV and tele removed and tolerated well. Pt denies any questions at this time.

## 2020-12-15 NOTE — PLAN OF CARE
Problem: Gas Exchange - Impaired  Goal: Absence of hypoxia  Outcome: Ongoing     Problem:  Body Temperature -  Risk of, Imbalanced  Goal: Ability to maintain a body temperature within defined limits  Outcome: Ongoing     Problem: Fatigue  Goal: Verbalize increase energy and improved vitality  Outcome: Ongoing     Problem: Falls - Risk of:  Goal: Will remain free from falls  Description: Will remain free from falls  Outcome: Ongoing

## 2020-12-15 NOTE — DISCHARGE SUMMARY
Hospital Medicine Discharge Summary      Patient ID: Matt Villarreal      Patient's PCP: Mounika Jeffery DO    Admit Date: 12/11/2020     Discharge Date: 12/15/2020      Admitting Physician: Lui Villarreal MD    Discharge Physician: uLi Villarreal MD     Discharge Diagnoses: Active Hospital Problems    Diagnosis Date Noted    Pneumonia due to COVID-19 virus [U07.1, J12.89] 12/11/2020         The patient was seen and examined on day of discharge and this discharge summary is in conjunction with any daily progress note from day of discharge. Hospital Course: The patient is a 77 y.o. male with medical history notable for JOY, dCHF, obesity with BMI of 38, who presented to [facility] with worsening shortness of breath. He had tele visit with his PCP who referred him for testing and Senora Numbers came back positive from 12/8/2020. ER course: O2 saturation was 88% on room air, CXR- multifocal infiltrates consistent with COVID 19 pneumonia, , procal 0.14, Ddimer 639, proBNP 332. Patient was admitted and started on decadron and remdesivir. Inflammatory markers came down nicely. Patient felt symptomatically better as well. He required 2 liters O2 initially, but was weaned off to room air. Home oxygen evaluation was done on the day of discharge and O2 sats stayed up in mid 90's on room air. Discharged with 4 more days of decadron and 30 day course of prophylactic eliquis 2.5 BID. isolation recommended for total of 20 days, ending on 12/28/2020. Consults:     IP CONSULT TO HOSPITALIST  IP CONSULT TO PHARMACY    Disposition:  Home    Discharged Condition: Stable    Code Status: Prior    Activity: activity as tolerated    Diet: cardiac diet    Follow Up: Primary Care Physician in one week    Exam:     General appearance: No apparent distress, appears stated age and cooperative. Lungs: Clear to ascultation, bilaterally without Rales/Wheezes/Rhonchi with good respiratory effort. Heart: Regular rate and rhythm with Normal S1/S2 without  murmurs, rubs or gallops, point of maximum impulse non-displaced  Abdomen: Soft, non-tender or non-distended without rigidity or guarding and positive bowel sounds all four quadrants. Extremities: No clubbing, cyanosis, or edema bilaterally. Skin: Skin color, texture, turgor normal.    Neurologic: Alert and oriented X 3,   grossly non-focal.  Mental status: Alert, oriented, thought content appropriate      Labs: For convenience and continuity at follow-up the following most recent labs are provided:    CBC:   Lab Results   Component Value Date    WBC 12.6 12/15/2020    HGB 16.8 12/15/2020    HCT 49.3 12/15/2020     12/15/2020       RENAL:   Lab Results   Component Value Date     12/15/2020    K 4.4 12/15/2020     12/15/2020    CO2 23 12/15/2020    BUN 19 12/15/2020    CREATININE 0.9 12/15/2020           Discharge Medications:   Discharge Medication List as of 12/15/2020 10:09 AM           Details   apixaban (ELIQUIS) 2.5 MG TABS tablet Take 1 tablet by mouth 2 times daily, Disp-60 tablet, R-0Print      dexamethasone (DECADRON) 6 MG tablet Take 1 tablet by mouth daily for 4 days, Disp-4 tablet, R-0Print              Details   promethazine-codeine (PHENERGAN WITH CODEINE) 6.25-10 MG/5ML syrup Take 5 mLs by mouth 4 times daily as needed for Cough (coughing. ..caution drowsiness) for up to 18 days. , Disp-180 mL,R-1Normal      furosemide (LASIX) 20 MG tablet Take 20 mg by mouth 2 times dailyHistorical Med      spironolactone (ALDACTONE) 25 MG tablet Take 1 tablet by mouthHistorical Med      Multiple Vitamins-Minerals (THERAPEUTIC MULTIVITAMIN-MINERALS) tablet Take 1 tablet by mouth dailyHistorical Med      Ascorbic Acid (VITAMIN C) 500 MG tablet Take 750 mg by mouth dailyHistorical Med      atorvastatin (LIPITOR) 40 MG tablet Take 1 tablet by mouth daily, Disp-30 tablet, R-2      magnesium oxide (MAG-OX) 400 MG tablet Take 400 mg by mouth carvedilol (COREG) 6.25 MG tablet Take 12.5 mg by mouth 2 times daily , Disp-60 tabletHistorical Med                Time Spent on discharge is more than 30 minutes in the examination, evaluation, counseling and review of medications and discharge plan. Signed:  Christiano Lugo MD   12/15/2020      Thank you Burley Hamman, DO for the opportunity to be involved in this patient's care. If you have any questions or concerns please feel free to contact me at [phone].

## 2020-12-15 NOTE — CARE COORDINATION
DISCHARGE Disposition: Home- No Services Needed    LOC at discharge: Not Applicable  MARIAMA Completed: Not Indicated    Notification completed in HENS/PAS?:  Not Applicable    IMM Completed:   Not Indicated    Transportation:  Transportation PLAN for discharge: family   Mode of Transport: Private Car  Reason for medical transport: Not Applicable  Name of Transport Company: Not Applicable  Time of Transport: TBD    Transport form completed: Not Indicated    Home Care:  [facility] ordered at discharge: Not [address]: Not Applicable  Orders faxed: No    Durable Medical Equipment:  DME Provider: n/a  Equipment obtained during hospitalization: n/a    Home Oxygen and Respiratory Equipment:  Oxygen needed at discharge?: Not [address]: Not Applicable  Portable tank available for discharge?: Not Indicated    Dialysis:  Dialysis patient: No    Dialysis Center:  Not Applicable    Hospice Services:  Location: Not Applicable  Agency: Not Applicable    Consents signed: Not Indicated    Referrals made at [facility] for outpatient continued care:  Not Applicable    Additional CM Notes:   Patient is from home with spouse, independent pta. No CM needs at this time. Spouse to transport at discharge. The Plan for Transition of Care is related to the following treatment goals of Pneumonia due to COVID-19 virus [U07.1, J12.89]  Pneumonia due to COVID-19 virus [U07.1, J12.89]    The Patient and/or patient representative Rosmery Javier and his family were provided with a choice of provider and agrees with the discharge plan Yes    Freedom of choice list was provided with basic dialogue that supports the patient's individualized plan of care/goals and shares the quality data associated with the providers.  Yes    Care Transitions patient: Yes    Ines Garcia RN  [facility]  Case Management Department  Ph: [phone]  Fax: [phone]

## 2020-12-16 ENCOUNTER — CARE COORDINATION (OUTPATIENT)
Dept: CASE MANAGEMENT | Age: 65
End: 2020-12-16

## 2020-12-16 ENCOUNTER — TELEPHONE (OUTPATIENT)
Dept: FAMILY MEDICINE CLINIC | Age: 65
End: 2020-12-16

## 2020-12-16 NOTE — CARE COORDINATION
Advance Care Planning  People with COVID-19 may have no symptoms, mild symptoms, such as fever, cough, and shortness of breath or they may have more severe illness, developing severe and fatal pneumonia. As a result, Advance Care Planning with attention to naming a health care decision maker (someone you trust to make healthcare decisions for you if you could not speak for yourself) and sharing other health care preferences is important BEFORE a possible health crisis. Please contact your Primary Care Provider to discuss Advance Care Planning. Preventing the Spread of Coronavirus Disease 2019 in Homes and Residential Communities  For the most recent information go to [email or web address]    Prevention steps for People with confirmed or suspected COVID-19 (including persons under investigation) who do not need to be hospitalized  and   People with confirmed COVID-19 who were hospitalized and determined to be medically stable to go home    Your healthcare provider and public health staff will evaluate whether you can be cared for at home. If it is determined that you do not need to be hospitalized and can be isolated at home, you will be monitored by staff from your local or state health department. You should follow the prevention steps below until a healthcare provider or local or state health department says you can return to your normal activities. Stay home except to get medical care  People who are mildly ill with COVID-19 are able to isolate at home during their illness. You should restrict activities outside your home, except for getting medical care. Do not go to work, school, or public areas. Avoid using public transportation, ride-sharing, or taxis. Separate yourself from other people and animals in your home  People: As much as possible, you should stay in a specific room and away from other people in your home.  Also, you should use a separate bathroom, if available. Animals: You should restrict contact with pets and other animals while you are sick with COVID-19, just like you would around other people. Although there have not been reports of pets or other animals becoming sick with COVID-19, it is still recommended that people sick with COVID-19 limit contact with animals until more information is known about the virus. When possible, have another member of your household care for your animals while you are sick. If you are sick with COVID-19, avoid contact with your pet, including petting, snuggling, being kissed or licked, and sharing food. If you must care for your pet or be around animals while you are sick, wash your hands before and after you interact with pets and wear a facemask. Call ahead before visiting your doctor  If you have a medical appointment, call the healthcare provider and tell them that you have or may have COVID-19. This will help the healthcare providers office take steps to keep other people from getting infected or exposed. Wear a facemask  You should wear a facemask when you are around other people (e.g., sharing a room or vehicle) or pets and before you enter a healthcare providers office. If you are not able to wear a facemask (for example, because it causes trouble breathing), then people who live with you should not stay in the same room with you, or they should wear a facemask if they enter your room. Cover your coughs and sneezes  Cover your mouth and nose with a tissue when you cough or sneeze. Throw used tissues in a lined trash can. Immediately wash your hands with soap and water for at least 20 seconds or, if soap and water are not available, clean your hands with an alcohol-based hand  that contains at least 60% alcohol.   Clean your hands often  Wash your hands often with soap and water for at least 20 seconds, especially after blowing your nose, coughing, or sneezing; going to the bathroom; and before eating or preparing food. If soap and water are not readily available, use an alcohol-based hand  with at least 60% alcohol, covering all surfaces of your hands and rubbing them together until they feel dry. Soap and water are the best option if hands are visibly dirty. Avoid touching your eyes, nose, and mouth with unwashed hands. Avoid sharing personal household items  You should not share dishes, drinking glasses, cups, eating utensils, towels, or bedding with other people or pets in your home. After using these items, they should be washed thoroughly with soap and water. Clean all high-touch surfaces everyday  High touch surfaces include counters, tabletops, doorknobs, bathroom fixtures, toilets, phones, keyboards, tablets, and bedside tables. Also, clean any surfaces that may have blood, stool, or body fluids on them. Use a household cleaning spray or wipe, according to the label instructions. Labels contain instructions for safe and effective use of the cleaning product including precautions you should take when applying the product, such as wearing gloves and making sure you have good ventilation during use of the product. Monitor your symptoms  Seek prompt medical attention if your illness is worsening (e.g., difficulty breathing). Before seeking care, call your healthcare provider and tell them that you have, or are being evaluated for, COVID-19. Put on a facemask before you enter the facility. These steps will help the healthcare providers office to keep other people in the office or waiting room from getting infected or exposed. Ask your healthcare provider to call the local or state health department. Persons who are placed under active monitoring or facilitated self-monitoring should follow instructions provided by their local health department or occupational health professionals, as appropriate. When working with your local health department check their available hours.   If you home?: Yes  Care Transitions Interventions  No Identified Needs       Summary  CTN spoke with patient's spouse Cas Abbott, this am for initial 24 hour discharge follow up CTN call. Spouse states patient is doing well, really tired and weak. No reports of any nausea, vomiting, fevers, chills, SOB or Cough. No difficulty with urination, BM's or appetite. Spouse instructed to continue to monitor patient for any worsening symptoms, allow patient to rest as needed and take all medications as prescribed. All medications filled and in home. CTN provided education on s/s that require medical attention and when to seek medical attention. CTN advised Pt of use of urgent care or physicians 24 hr access line if assistance is needed after hours or weekend. Pt denies any needs or concerns and is agreeable with additional calls.     Follow Up  Future Appointments   Date Time Provider Department Center   10/27/2021  9:20 AM EL Tavarez - CNP [facility]       Michell Saxena RN

## 2020-12-17 ENCOUNTER — TELEPHONE (OUTPATIENT)
Dept: FAMILY MEDICINE CLINIC | Age: 65
End: 2020-12-17

## 2020-12-17 NOTE — TELEPHONE ENCOUNTER
Spoke to [employer]. Finally got a prior Auth for Xarelto for prevention of thromboembolic phenomenon in an acutely ill patient. Spoke with the patient and advised him of the change from Eliquis which was written at the hospital to [address] [place] [address]. Instructed in use.

## 2020-12-23 DIAGNOSIS — U07.1 PNEUMONIA DUE TO COVID-19 VIRUS: ICD-10-CM

## 2020-12-23 DIAGNOSIS — J12.82 PNEUMONIA DUE TO COVID-19 VIRUS: ICD-10-CM

## 2020-12-28 ENCOUNTER — VIRTUAL VISIT (OUTPATIENT)
Dept: FAMILY MEDICINE CLINIC | Age: 65
End: 2020-12-28
Payer: COMMERCIAL

## 2020-12-28 PROCEDURE — 1111F DSCHRG MED/CURRENT MED MERGE: CPT | Performed by: FAMILY MEDICINE

## 2020-12-28 PROCEDURE — 99495 TRANSJ CARE MGMT MOD F2F 14D: CPT | Performed by: FAMILY MEDICINE

## 2020-12-28 ASSESSMENT — PATIENT HEALTH QUESTIONNAIRE - PHQ9
2. FEELING DOWN, DEPRESSED OR HOPELESS: 0
SUM OF ALL RESPONSES TO PHQ9 QUESTIONS 1 & 2: 0
SUM OF ALL RESPONSES TO PHQ QUESTIONS 1-9: 0
SUM OF ALL RESPONSES TO PHQ QUESTIONS 1-9: 0
1. LITTLE INTEREST OR PLEASURE IN DOING THINGS: 0
SUM OF ALL RESPONSES TO PHQ QUESTIONS 1-9: 0

## 2020-12-28 ASSESSMENT — ENCOUNTER SYMPTOMS
SHORTNESS OF BREATH: 0
COUGH: 0

## 2020-12-29 NOTE — PROGRESS NOTES
Post-Discharge Transitional Care Management Services or Hospital Follow Up      Clarissa Giles   YOB: 1955    Date of Office Visit:  12/28/2020  Date of Hospital Admission: 12/11/20  Date of Hospital Discharge: 12/15/20  Risk of hospital readmission (high >=14%.  Medium >=10%) :Readmission Risk Score: 12      Care management risk score Rising risk (score 2-5) and Complex Care (Scores >=6): 5     Non face to face  following discharge, date last encounter closed (first attempt may have been earlier): 12/16/2020 12:05 PM    Call initiated 2 business days of discharge: Yes    Patient Active Problem List   Diagnosis    [facility] failure [facility]    Atrial fibrillation ([facility]    History of heart valve repair    Obstructive sleep apnea    Shifting sleep-work schedule, affecting sleep    Class 3 severe obesity without serious comorbidity with body mass index (BMI) of 40.0 to 44.9 in [facility])    Pericardial effusion    Mitral valve disease    HLD (hyperlipidemia)    History of atrial flutter    Essential hypertension    Bacterial pneumonia    Pneumonia due to COVID-19 virus       Allergies   Allergen Reactions    Propofol      Other reaction(s): Confusion  Elucidations and psychotic behavior postop November 2016 mitral valve repair       Medications listed as ordered at the time of discharge from hospital   Evaristo Antoine   Home Medication Instructions JESSICA:    Printed on:12/28/20 0625   Medication Information                      Ascorbic Acid (VITAMIN C) 500 MG tablet  Take 750 mg by mouth daily             atorvastatin (LIPITOR) 40 MG tablet  Take 1 tablet by mouth daily             carvedilol (COREG) 6.25 MG tablet  Take 12.5 mg by mouth 2 times daily              furosemide (LASIX) 20 MG tablet  Take 20 mg by mouth 2 times daily             magnesium oxide (MAG-OX) 400 MG tablet  Take 400 mg by mouth             Multiple Vitamins-Minerals (THERAPEUTIC MULTIVITAMIN-MINERALS) tablet COVID-19 pneumonia  Hypertension  Obstructive sleep apnea  Subjective:      Patient ID: Matt Tee is a 77 y.o. y.o. male. HPI      Chief Complaint   Patient presents with    Follow-Up from Hospital     D/C 12/15/2020 Covid and Pneumonia [place]-day feeling better    Headache     bloody nose today       Allergies   Allergen Reactions    Propofol      Other reaction(s): Confusion  Elucidations and psychotic behavior postop November 2016 mitral valve repair       Past Medical History:   Diagnosis Date    [facility]     CHF (congestive heart failure) (HCC)     Hyperlipidemia     Obstructive sleep apnea (adult) (pediatric)     on CPAP    Shifting sleep-work schedule, affecting sleep 6/14/2017       Past Surgical History:   Procedure Laterality Date    MITRAL VALVULOPLASTY  11/2016       Social History     Socioeconomic History    Marital status:      Spouse name: Not on file    Number of children: Not on file    Years of education: Not on file    Highest education level: Not on file   Occupational History    Not on file   Social Needs    Financial resource strain: Not on file    Food insecurity     Worry: Not on file     Inability: Not on file    Transportation needs     Medical: Not on file     Non-medical: Not on file   Tobacco Use    Smoking status: Never Smoker    Smokeless tobacco: Never Used   Substance and Sexual Activity    Alcohol use: No     Alcohol/week: 0.0 standard drinks     Comment: 1-2 beers per week.  no longer drinking     Drug use: No    Sexual activity: Yes     Partners: Female   Lifestyle    Physical activity     Days per week: Not on file     Minutes per session: Not on file    Stress: Not on file   Relationships    Social connections     Talks on phone: Not on file     Gets together: Not on file     Attends Synagogue service: Not on file     Active member of club or organization: Not on file Attends meetings of clubs or organizations: Not on file     Relationship status: Not on file    Intimate partner violence     Fear of current or ex partner: Not on file     Emotionally abused: Not on file     Physically abused: Not on file     Forced sexual activity: Not on file   Other Topics Concern    Not on file   Social History Narrative    Not on file       Family History   Problem Relation Age of Onset    Other Father         JOY       There were no vitals filed for this visit. Wt Readings from Last 3 Encounters:   12/11/20 (!) 327 lb 2.6 oz (148.4 kg)   10/16/19 (!) 329 lb (149.2 kg)   12/10/18 (!) 326 lb (147.9 kg)       Review of Systems   Constitutional:        Feels significantly better than he did on entry to the hospital.  Pulse ox is 97%. Pulse was 74 today. No fever or chills. Fatigues a bit easily right now, but not significant. HENT: Negative for congestion. Respiratory: Negative for cough and shortness of breath. Cardiovascular: Negative for chest pain, palpitations and leg swelling. Remote history of A. fib. No current palpitations, peripheral edema or orthopnea. Uses CPAP for sleep apnea. He has been prescribed a 1 month course of anticoagulant due to the thrombotic nature of the Covid virus. He has no sequela. Neurological: Negative for seizures, facial asymmetry and speech difficulty. No focal neuro signs. He has some positional lightheaded/dizzy feeling. It is transient and minor. Is controlled with meclizine currently. Has no side effects with the meclizine. Discussed this symptom and precautions. Will refer to ENT if it continues. Psychiatric/Behavioral: Negative for dysphoric mood, self-injury and suicidal ideas. Objective:   Physical Exam  Constitutional:       General: He is not in acute distress. Appearance: He is not ill-appearing.    Pulmonary:      Effort: Pulmonary effort is normal. Comments: Respirations easy and unlabored. No respiratory distress noted. Neurological:      General: No focal deficit present. Mental Status: He is alert and oriented to person, place, and time. Psychiatric:         Mood and Affect: Mood normal.         Behavior: Behavior normal.         Assessment:      COVID-19 pneumonia  Obstructive sleep apnea  Hypertension, treated        Plan:     Continue routine medicines. Continue and finish 1 month of Xarelto. Call if the dizzy sensation continues. Follow-up in 1 month. Current Outpatient Medications   Medication Sig Dispense Refill    rivaroxaban (XARELTO) 20 MG TABS tablet Take 1 tablet by mouth daily (with breakfast) 30 tablet 0    furosemide (LASIX) 20 MG tablet Take 20 mg by mouth 2 times daily      spironolactone (ALDACTONE) 25 MG tablet Take 1 tablet by mouth      Multiple Vitamins-Minerals (THERAPEUTIC MULTIVITAMIN-MINERALS) tablet Take 1 tablet by mouth daily      Ascorbic Acid (VITAMIN C) 500 MG tablet Take 750 mg by mouth daily      atorvastatin (LIPITOR) 40 MG tablet Take 1 tablet by mouth daily 30 tablet 2    magnesium oxide (MAG-OX) 400 MG tablet Take 400 mg by mouth      carvedilol (COREG) 6.25 MG tablet Take 12.5 mg by mouth 2 times daily  60 tablet      No current facility-administered medications for this visit.               Medical Decision Making: moderate complexity

## 2020-12-30 ENCOUNTER — CARE COORDINATION (OUTPATIENT)
Dept: CASE MANAGEMENT | Age: 65
End: 2020-12-30

## 2020-12-30 NOTE — CARE COORDINATION
You Patient resolved from the Care Transitions episode on 12/30/2020    Patient's Spouse states that patient has not had another COVID-19 Test, since discharge from hospital.  Patient went back to work today. Patient/family has been provided the following resources and education related to COVID-19:                         Signs, symptoms and red flags related to COVID-19            CDC exposure and quarantine guidelines            [facility] exposure contact - [phone]            Contact for their local Department of Health                 Patient currently reports that the following symptoms have improved:  no new/worsening symptoms. No other issues or concerns at this time. No further outreach scheduled with this CTN/ACM. Episode of Care resolved. Patient has this CTN/ACM contact information if future needs arise.     Thank Jael Dubose RN  Care Transition Coordinator  Contact MAQZMELISA:[phone]

## 2021-01-13 ENCOUNTER — HOSPITAL ENCOUNTER (OUTPATIENT)
Dept: GENERAL RADIOLOGY | Age: 66
Discharge: HOME | End: 2021-01-13
Payer: COMMERCIAL

## 2021-01-13 ENCOUNTER — TELEPHONE (OUTPATIENT)
Dept: FAMILY MEDICINE CLINIC | Age: 66
End: 2021-01-13

## 2021-01-13 DIAGNOSIS — J12.82 PNEUMONIA DUE TO COVID-19 VIRUS: Primary | ICD-10-CM

## 2021-01-13 DIAGNOSIS — U07.1 PNEUMONIA DUE TO COVID-19 VIRUS: Primary | ICD-10-CM

## 2021-01-13 DIAGNOSIS — J12.82 PNEUMONIA DUE TO COVID-19 VIRUS: ICD-10-CM

## 2021-01-13 DIAGNOSIS — U07.1 PNEUMONIA DUE TO COVID-19 VIRUS: ICD-10-CM

## 2021-01-13 PROCEDURE — 71046 X-RAY EXAM CHEST 2 VIEWS: CPT

## 2021-03-01 ENCOUNTER — TELEPHONE (OUTPATIENT)
Dept: FAMILY MEDICINE CLINIC | Age: 66
End: 2021-03-01

## 2021-03-01 NOTE — TELEPHONE ENCOUNTER
francy ent would like to know if he has to wait 90 days to get the covid vaccine after being cleared from Covid

## 2021-03-01 NOTE — TELEPHONE ENCOUNTER
Tell the patient that he can get a vaccine if one is available. He had Covid early in December so 3 months is almost up anyway. If his appointment to get one comes up he should get it.

## 2021-08-20 LAB — LEFT VENTRICULAR EJECTION FRACTION, EXTERNAL: 63

## 2021-10-06 ENCOUNTER — OFFICE VISIT (OUTPATIENT)
Dept: FAMILY MEDICINE CLINIC | Age: 66
End: 2021-10-06
Payer: COMMERCIAL

## 2021-10-06 VITALS
WEIGHT: 315 LBS | RESPIRATION RATE: 16 BRPM | TEMPERATURE: 97.6 F | OXYGEN SATURATION: 95 % | HEART RATE: 75 BPM | BODY MASS INDEX: 41.99 KG/M2 | DIASTOLIC BLOOD PRESSURE: 80 MMHG | SYSTOLIC BLOOD PRESSURE: 138 MMHG

## 2021-10-06 DIAGNOSIS — R05.3 CHRONIC COUGH: ICD-10-CM

## 2021-10-06 DIAGNOSIS — D22.9 NEVUS: ICD-10-CM

## 2021-10-06 DIAGNOSIS — S99.929A: Primary | ICD-10-CM

## 2021-10-06 PROCEDURE — 90694 VACC AIIV4 NO PRSRV 0.5ML IM: CPT | Performed by: NURSE PRACTITIONER

## 2021-10-06 PROCEDURE — 99213 OFFICE O/P EST LOW 20 MIN: CPT | Performed by: NURSE PRACTITIONER

## 2021-10-06 PROCEDURE — 90471 IMMUNIZATION ADMIN: CPT | Performed by: NURSE PRACTITIONER

## 2021-10-06 RX ORDER — ALBUTEROL SULFATE 90 UG/1
2 AEROSOL, METERED RESPIRATORY (INHALATION) 4 TIMES DAILY PRN
Qty: 1 EACH | Refills: 0 | Status: SHIPPED | OUTPATIENT
Start: 2021-10-06

## 2021-10-06 RX ORDER — VITAMIN B COMPLEX
1000 TABLET ORAL DAILY
COMMUNITY

## 2021-10-06 ASSESSMENT — PATIENT HEALTH QUESTIONNAIRE - PHQ9
2. FEELING DOWN, DEPRESSED OR HOPELESS: 0
SUM OF ALL RESPONSES TO PHQ QUESTIONS 1-9: 0
SUM OF ALL RESPONSES TO PHQ9 QUESTIONS 1 & 2: 0
1. LITTLE INTEREST OR PLEASURE IN DOING THINGS: 0

## 2021-10-06 ASSESSMENT — ENCOUNTER SYMPTOMS
COUGH: 0
NAUSEA: 0
SHORTNESS OF BREATH: 0
DIARRHEA: 0
VOMITING: 0

## 2021-10-06 NOTE — PROGRESS NOTES
10/6/2021     Chief Complaint   Patient presents with    Cough     cronic cough     Toe Pain     Great toe pain- stubbed toe two months ago     Other     two moles on right side of abdomen      Stephen Brunner (:  1955) is a 77 y.o. male, here for evaluation of the following medical concerns:    HPI  Cough: chronic, x years. Worsened since he had COVID pneumonia last December. Cough is dry. No CP. Some decreased exercise tolerance- works in [employer] and has to go more slowly climbing up the ladder 40 ft to get in there. Cough mostly occurs when he is changing environments from hot to cold or vice versa. Uses CPAP at night- does not routinely clean. Toe pain: bent back the toenail on the left great toe pain a month ago. No pain, has some discoloration. Wants to make sure OK. Nevus: right lateral abdomen, gets caught on shirt and bleeds. Wants to have removed. Also not new. Review of Systems   Constitutional: Negative for fatigue and fever. Respiratory: Negative for cough and shortness of breath. Cardiovascular: Negative for chest pain and leg swelling. Gastrointestinal: Negative for diarrhea, nausea and vomiting. Neurological: Negative for dizziness and headaches. Prior to Visit Medications    Medication Sig Taking?  Authorizing Provider   Vitamin D (CHOLECALCIFEROL) 25 MCG (1000 UT) TABS tablet Take 1,000 Units by mouth daily Yes Historical Provider, MD   albuterol sulfate HFA (VENTOLIN HFA) 108 (90 Base) MCG/ACT inhaler Inhale 2 puffs into the lungs 4 times daily as needed (cough) Yes Juanpablo Rodriguez APRN - CNP   furosemide (LASIX) 20 MG tablet Take 20 mg by mouth 2 times daily Yes Historical Provider, MD   spironolactone (ALDACTONE) 25 MG tablet Take 1 tablet by mouth Yes Historical Provider, MD   Multiple Vitamins-Minerals (THERAPEUTIC MULTIVITAMIN-MINERALS) tablet Take 1 tablet by mouth daily Yes Historical Provider, MD   Ascorbic Acid (VITAMIN C) 500 MG tablet Take 750 mg by mouth daily Yes Historical Provider, MD   atorvastatin (LIPITOR) 40 MG tablet Take 1 tablet by mouth daily Yes Lona Reynoso APRN - CNP   magnesium oxide (MAG-OX) 400 MG tablet Take 400 mg by mouth Yes Historical Provider, MD   carvedilol (COREG) 6.25 MG tablet Take 12.5 mg by mouth 2 times daily  Yes Manjit Mejia, DO        Social History     Tobacco Use    Smoking status: Never Smoker    Smokeless tobacco: Never Used   Substance Use Topics    Alcohol use: No     Alcohol/week: 0.0 standard drinks     Comment: 1-2 beers per week. no longer drinking         Vitals:    10/06/21 1134   BP: 138/80   Site: Left Upper Arm   Position: Sitting   Pulse: 75   Resp: 16   Temp: 97.6 °F (36.4 °C)   TempSrc: Temporal   SpO2: 95%   Weight: (!) 345 lb (156.5 kg)     Estimated body mass index is 41.99 kg/m² as calculated from the following:    Height as of 12/11/20: 6' 4\" (1.93 m). Weight as of this encounter: 345 lb (156.5 kg). Physical Exam  Vitals and nursing note reviewed. Constitutional:       General: He is not in acute distress. Appearance: Normal appearance. He is well-developed. He is obese. He is not ill-appearing, toxic-appearing or diaphoretic. HENT:      Head: Normocephalic and atraumatic. Eyes:      Conjunctiva/sclera: Conjunctivae normal.      Pupils: Pupils are equal, round, and reactive to light. Cardiovascular:      Rate and Rhythm: Normal rate and regular rhythm. Heart sounds: Normal heart sounds. No murmur heard. No friction rub. No gallop. Pulmonary:      Effort: Pulmonary effort is normal. No respiratory distress. Breath sounds: Normal breath sounds. No stridor. No wheezing, rhonchi or rales. Neurological:      General: No focal deficit present. Mental Status: He is alert and oriented to person, place, and time. Mental status is at baseline. Cranial Nerves: No cranial nerve deficit. ASSESSMENT/PLAN:  1.  Injury of toenail, initial encounter- no signs of infection, no pain. Monitor. Advised nail will take months to regrow. 2. Chronic cough- albuterol prn for cough. Clean CPAP. F/U if no relief in cough with albuterol    3. Nevus- referred to derm  - [facility] - Adan Martino MD, Dermatology, [place]      An electronic signature was used to authenticate this note.     --Mark Host, APRN - CNP on 10/6/2021 at 1:21 PM

## 2021-10-27 ENCOUNTER — VIRTUAL VISIT (OUTPATIENT)
Dept: PULMONOLOGY | Age: 66
End: 2021-10-27
Payer: COMMERCIAL

## 2021-10-27 DIAGNOSIS — I50.30 DIASTOLIC HEART FAILURE, UNSPECIFIED HF CHRONICITY (HCC): Chronic | ICD-10-CM

## 2021-10-27 DIAGNOSIS — I10 ESSENTIAL HYPERTENSION: ICD-10-CM

## 2021-10-27 DIAGNOSIS — I48.0 PAROXYSMAL ATRIAL FIBRILLATION (HCC): Chronic | ICD-10-CM

## 2021-10-27 DIAGNOSIS — E66.01 CLASS 3 SEVERE OBESITY DUE TO EXCESS CALORIES WITHOUT SERIOUS COMORBIDITY WITH BODY MASS INDEX (BMI) OF 40.0 TO 44.9 IN ADULT (HCC): ICD-10-CM

## 2021-10-27 DIAGNOSIS — G47.33 OBSTRUCTIVE SLEEP APNEA: Primary | Chronic | ICD-10-CM

## 2021-10-27 DIAGNOSIS — G47.26 SHIFTING SLEEP-WORK SCHEDULE, AFFECTING SLEEP: Chronic | ICD-10-CM

## 2021-10-27 PROCEDURE — 99214 OFFICE O/P EST MOD 30 MIN: CPT | Performed by: NURSE PRACTITIONER

## 2021-10-27 ASSESSMENT — SLEEP AND FATIGUE QUESTIONNAIRES
HOW LIKELY ARE YOU TO NOD OFF OR FALL ASLEEP WHILE SITTING AND TALKING TO SOMEONE: 0
ESS TOTAL SCORE: 0
HOW LIKELY ARE YOU TO NOD OFF OR FALL ASLEEP WHILE SITTING INACTIVE IN A PUBLIC PLACE: 0
HOW LIKELY ARE YOU TO NOD OFF OR FALL ASLEEP WHEN YOU ARE A PASSENGER IN A CAR FOR AN HOUR WITHOUT A BREAK: 0
HOW LIKELY ARE YOU TO NOD OFF OR FALL ASLEEP IN A CAR, WHILE STOPPED FOR A FEW MINUTES IN TRAFFIC: 0
HOW LIKELY ARE YOU TO NOD OFF OR FALL ASLEEP WHILE SITTING QUIETLY AFTER LUNCH WITHOUT ALCOHOL: 0
HOW LIKELY ARE YOU TO NOD OFF OR FALL ASLEEP WHILE LYING DOWN TO REST IN THE AFTERNOON WHEN CIRCUMSTANCES PERMIT: 0
HOW LIKELY ARE YOU TO NOD OFF OR FALL ASLEEP WHILE WATCHING TV: 0
HOW LIKELY ARE YOU TO NOD OFF OR FALL ASLEEP WHILE SITTING AND READING: 0

## 2021-10-27 NOTE — ASSESSMENT & PLAN NOTE
Chronic- stable. After speaking with patient:    Agree with current plan, and would agree to continue this plan per prescribing and managing physician. Patient is scheduled for Anticoag follow-up appointment on Monday 8/26/19 at 1:30 pm for INR fingerstick.  Will ask her to go to the lab on Monday 8/26/19 for BMP.

## 2021-10-27 NOTE — PROGRESS NOTES
Merlin Álvarez         : 1955    Diagnosis: [x] JOY (G47.33) [] CSA (G47.31) [] Apnea (G47.30)   Length of Need: [x] 12 Months [] 99 Months [] Other:    Machine (GERTRUDIS!): [] Respironics Dream Station   2   Auto [] ResMed AirSense     Auto [] Other:     []  CPAP () [] Bilevel ()   Mode: [] Auto [] Spontaneous    Mode: [] Auto [] Spontaneous            Comfort Settings:   - Ramp Pressure:  cmH2O                                        - Ramp time: 15 min                                     -  Flex/EPR - 3 full time                                    - For ResMed Bilevel (TiMax-4 sec   TiMin- 0.2 sec)     Humidifier: [x] Heated ()        [x] Water chamber replacement ()/ 1 per 6 months        Mask:   [x] Nasal () /1 per 3 months [] Full Face () /1 per 3 months   [x] Patient choice -Size and fit mask [] Patient Choice - Size and fit mask   [] Dispense:  [] Dispense:    [x] Headgear () / 1 per 3 months [] Headgear () / 1 per 3 months   [x] Replacement Nasal Cushion ()/2 per month [] Interface Replacement ()/1 per month   [] Replacement Nasal Pillows ()/2 per month         Tubing: [x] Heated ()/1 per 3 months    [] Standard ()/1 per 3 months [] Other:           Filters: [x] Non-disposable ()/1 per 6 months     [x] Ultra-Fine, Disposable ()/2 per month        Miscellaneous: [] Chin Strap ()/ 1 per 6 months [] O2 bleed-in:       LPM   [] Oximetry on CPAP/Bilevel []  Other:    [x] Modem: ()         Start Order Date: 10/27/21    MEDICAL JUSTIFICATION:  I, the undersigned, certify that the above prescribed supplies are medically necessary for this patients wellbeing. In my opinion, the supplies are both reasonable and necessary in reference to accepted standards of medicalpractice in treatment of this patients condition.     EL Whittaker - CNP      NPI: [id number]       Order Signed Date: 10/27/21    Electronically signed by Marleny Post, EL - CNP on 10/27/2021 at 9:19 AM    Jericho [place]  1955  [id number]  [phone] (home) [phone] (work)  [phone] (mobile)      Insurance Info (confirm with patient if correct):  Payor/Plan Subscr  Sex Relation Sub.  Ins. ID Effective Group Num

## 2021-10-27 NOTE — PROGRESS NOTES
Having painful Aerophagia  [] Yes  [x] No   Nocturia   0-1  per night. Having  HA upon waking  [] Yes  [x] No   Dry mouth upon waking   Dry Nose  Dry Eyes  [x] Yes  [] No   Congestion upon waking   [] Yes  [x] No    Nose Bleeds  [] Yes  [x] No   Using Sleep Aides  [x] NA  [] OTC  [] Per our office   [] Per another provider   Understands how to change humidification and/or tubing temperature for comfort while at home  [x] Yes  [] No     Difficulties falling asleep  [] Yes  [x] No   Difficulties staying asleep  [] Yes  [x] No   Approximate time to bed  D: 10pm  3-11: 12am  N: 9am   Approximate wake time  D: 5am  3-11: 7am  N: 12pm   Taking Naps  frequent   If taking naps usual length  3 hours     [] NA   If taking naps using the machine [] NA  [x] Yes    [] No    [] With and With out    Drowsy when driving  [] Yes  [x] No     Does patient carry a DOT/CDL  [] Yes  [x] No     Does patient carry FAA/[employer] License   [] Yes  [x] No      Any concerns noted with the machine at this time  [] Yes  [x] No       Assessment/Plan:   1. Obstructive sleep apnea  Assessment & Plan:  After downloading data and reviewing  Reviewed compliance download with pt. Supplies and parts as needed for his machine. These are medically necessary. Continue medications per his PCP and other physicians. Limit caffeine use after 3pm.    The chronic medical conditions listed are directly related to the primary diagnosis listed above. The management of the primary diagnosis affects the secondary diagnosis and vice versa    Patient is complaint encouraged to maintain compliance to aide on controlling other stated healthcare concerns. 2. Shifting sleep-work schedule, affecting sleep  Assessment & Plan:  Chronic- stable. After speaking with patient:    Agree with current plan, and would agree to continue this plan per prescribing and managing physician.       3. Diastolic heart failure, unspecified HF chronicity (HCC)  Assessment & Plan:  Chronic- stable. After speaking with patient:    Agree with current plan, and would agree to continue this plan per prescribing and managing physician. 4. Essential hypertension  Assessment & Plan:  Chronic- stable. After speaking with patient:    Agree with current plan, and would agree to continue this plan per prescribing and managing physician. 5. Class 3 severe obesity due to excess calories without serious comorbidity with body mass index (BMI) of 40.0 to 44.9 in adult [facility])  Assessment & Plan:  Patient encouraged to work on maintaining a healthy weight per height. Achievable with diet restriction/modifications and exercise (may consult primary care if unsure of any restrictions or concerns). Weight management directly correlates to risk of control and maintenance of Obstructive Sleep Apnea. 6. Paroxysmal atrial fibrillation (HCC)  Assessment & Plan:  Chronic- stable. After speaking with patient:    Agree with current plan, and would agree to continue this plan per prescribing and managing physician. - After pulling data and reviewing it   - Reviewed compliance download with patient    -Medically necessary supplies and parts as needed for his machine.   - Continue medications per his primary care provider and other physicians.   - Encouraged to limit caffeine use after 3pm.    - Encouraged him to work on weight loss through diet and exercise  - Educated not to drive when feeling sleepy   - Patient using [employer]  - Napping with the machine  Office locations  Compliance  Follow up  The risk of undercontrolling Obstructive sleep apnea  After speaking to the patient, patient is currently stable. We will continue with the current machine settings  Recall Information and DME contact     The chronic medical conditions listed are directly related to the primary diagnosis listed above.     The management of the primary diagnosis affects the secondary diagnosis and vice versa.     - Will follow up in off in 12 months    Electronically signed by  Chelsey Bashir, MSN, RN, CNP on 10/27/2021 at 9:14 AM

## 2022-03-02 ENCOUNTER — NURSE TRIAGE (OUTPATIENT)
Dept: OTHER | Facility: CLINIC | Age: 67
End: 2022-03-02

## 2022-03-02 ENCOUNTER — TELEPHONE (OUTPATIENT)
Dept: FAMILY MEDICINE CLINIC | Age: 67
End: 2022-03-02

## 2022-03-02 NOTE — TELEPHONE ENCOUNTER
Received call from Carrillo Colón at [facility] with Red Flag Complaint. Subjective: Caller states \"I think I have sinus infection and yeast infection, but I've been having some dizziness in the morning when I first wake up and then it goes away. \"     Current Symptoms:   Pt reporting he's suspecting a sinus infection due to nasal congestion, headache, and chills - Additionally, he has been experiencing dizziness in the morning hours and resolves on it's own for the past two weeks (pt has been wearing CPAP at night consistently) - denies any shortness of breath, chest pain, or palpitations     Pt has suspected yeast infection    Diarrhea 3 days ago (resolved)     Normal appetite and adequate fluid intake     Pt does not check vitals at home   Pt has not been recently tested for COVID - pt has been vaccinated     Onset: 2 weeks ago; unchanged    Associated Symptoms: NA    Pain Severity: Chronic knee pain - unchanged from normal     Temperature: 98.8F    What has been tried: Advil, Zinc    LMP: NA Pregnant: NA    Recommended disposition: See PCP within 24 Hours If no appointments, go to [address] [place] [address] advice provided, patient verbalizes understanding; denies any other questions or concerns; instructed to call back for any new or worsening symptoms. Patient/Caller agrees with recommended disposition; writer provided warm transfer to [employer] at [facility] for appointment scheduling     Attention Provider: Thank you for allowing me to participate in the care of your patient. The patient was connected to triage in response to information provided to the ECC/PSC. Please do not respond through this encounter as the response is not directed to a shared pool.     Reason for Disposition   [1] Dizziness is main symptom AND [2] NO spinning sensation (i.e., vertigo)   [1] MODERATE dizziness (e.g., interferes with normal activities) AND [2] has NOT been evaluated by physician for this  (Exception: dizziness caused by heat exposure, sudden standing, or poor fluid intake)    Protocols used: DIZZINESS - VERTIGO-ADULT-AH, DIZZINESS - LIGHTHEADEDNESS-ADULT-AH

## 2022-03-02 NOTE — TELEPHONE ENCOUNTER
----- Message from Rossy Lai sent at 3/2/2022  3:15 PM EST -----  Subject: Appointment Request    Reason for Call: Urgent Cough Cold    QUESTIONS  Type of Appointment? Established Patient  Reason for appointment request? No appointments available during search  Additional Information for Provider? Patient is unbalanced when standing   in the morning, wants to have ears checked for wax, sinus infection. There   are no openings available. Would like to be put on cancelation list or   squeezed in but needs an appt around 4:00 pm. Please call back and advise.  ---------------------------------------------------------------------------  --------------  CALL BACK INFO  What is the best way for the office to contact you? OK to leave message on   voicemail  Preferred Call Back Phone Number? [phone]  ---------------------------------------------------------------------------  --------------  SCRIPT ANSWERS  Relationship to Patient? Self  Are you currently unable to finish sentences due to any difficulty   breathing? No  Are you unable to swallow liquids? No  Are you having fevers (100.4 or greater), chills, or sweats? No  Do you have COPD, asthma or a chronic lung condition? No  Have your symptoms been present for more than 5 days? Yes   Have you been diagnosed with, awaiting test results for, or told that you   are suspected of having COVID-19 (Coronavirus)? (If patient has tested   negative or was tested as a requirement for work, school, or travel and   not based on symptoms, answer no)? No  Within the past 10 days have you developed any of the following symptoms   (answer no if symptoms have been present longer than 10 days or began   more than 10 days ago)?  Fever or Chills, Cough, Shortness of breath or   difficulty breathing, Loss of taste or smell, Sore throat, Nasal   congestion, Sneezing or runny nose, Fatigue or generalized body aches   (answer no if pain is specific to a body part e.g. back pain), Diarrhea,   Headache? No  Have you had close contact with someone with COVID-19 in the last 7 days? No  (Service Expert  click yes below to proceed with [place] As Usual   Scheduling)?  Yes

## 2022-03-18 ENCOUNTER — TELEMEDICINE (OUTPATIENT)
Dept: FAMILY MEDICINE CLINIC | Age: 67
End: 2022-03-18
Payer: COMMERCIAL

## 2022-03-18 DIAGNOSIS — R05.8 PRODUCTIVE COUGH: Primary | ICD-10-CM

## 2022-03-18 DIAGNOSIS — I10 ESSENTIAL HYPERTENSION: ICD-10-CM

## 2022-03-18 PROCEDURE — 3017F COLORECTAL CA SCREEN DOC REV: CPT | Performed by: FAMILY MEDICINE

## 2022-03-18 PROCEDURE — 4040F PNEUMOC VAC/ADMIN/RCVD: CPT | Performed by: FAMILY MEDICINE

## 2022-03-18 PROCEDURE — G8427 DOCREV CUR MEDS BY ELIG CLIN: HCPCS | Performed by: FAMILY MEDICINE

## 2022-03-18 PROCEDURE — 1123F ACP DISCUSS/DSCN MKR DOCD: CPT | Performed by: FAMILY MEDICINE

## 2022-03-18 PROCEDURE — 99213 OFFICE O/P EST LOW 20 MIN: CPT | Performed by: FAMILY MEDICINE

## 2022-03-18 RX ORDER — AZITHROMYCIN 250 MG/1
250 TABLET, FILM COATED ORAL DAILY
Qty: 1 PACKET | Refills: 0 | Status: SHIPPED | OUTPATIENT
Start: 2022-03-18

## 2022-03-18 RX ORDER — BENZONATATE 100 MG/1
100 CAPSULE ORAL 3 TIMES DAILY PRN
Qty: 30 CAPSULE | Refills: 1 | Status: SHIPPED | OUTPATIENT
Start: 2022-03-18 | End: 2022-03-28

## 2022-03-18 SDOH — ECONOMIC STABILITY: FOOD INSECURITY: WITHIN THE PAST 12 MONTHS, THE FOOD YOU BOUGHT JUST DIDN'T LAST AND YOU DIDN'T HAVE MONEY TO GET MORE.: NEVER TRUE

## 2022-03-18 SDOH — ECONOMIC STABILITY: FOOD INSECURITY: WITHIN THE PAST 12 MONTHS, YOU WORRIED THAT YOUR FOOD WOULD RUN OUT BEFORE YOU GOT MONEY TO BUY MORE.: NEVER TRUE

## 2022-03-18 ASSESSMENT — ENCOUNTER SYMPTOMS: COUGH: 1

## 2022-03-18 ASSESSMENT — SOCIAL DETERMINANTS OF HEALTH (SDOH): HOW HARD IS IT FOR YOU TO PAY FOR THE VERY BASICS LIKE FOOD, HOUSING, MEDICAL CARE, AND HEATING?: NOT HARD AT ALL

## 2022-03-18 NOTE — PROGRESS NOTES
3/18/2022    TELEHEALTH EVALUATION -- Audio/Visual (During [id number] public health emergency)    HPI:    Shanique Mosley (:  1955) has requested an audio/video evaluation for the following concern(s):    Chief Complaint   Patient presents with    Cough     going on for a while        Cough present for several years and has worsening in the last week, today not bad, coughing up yellow phlegm, admits to [facility] in the morning starting 1 week ago, /84, states that his systolic is usually upper 140's. Denies f/c or acid reflux, denies weight gain or fluid retention. Still taking Lasix 20 mg BID, Spironolactone 25 mg daily, Carvediol 6.25 mg BID, sees Cardiology once a year and states he last saw last fall, no CP or SOB. Weight gain/edema      Previously sent a message to the office on 2022 admitting to balance issue when standing in the morning and wanted to have his ears checked for cerumen and to be evaluated for sinus infection. Seen on 2021 at the [facility] [place] [address] emergency department in [facility] for chest pain and diagnosis was acid reflux. Was started on Pepcid and told to follow-up closely with his cardiologist.    Review of Systems   Respiratory: Positive for cough. Prior to Visit Medications    Medication Sig Taking?  Authorizing Provider   azithromycin (ZITHROMAX Z-SHIV) 250 MG tablet Take 1 tablet by mouth daily As directed on pack Yes Claudia Villarreal DO   benzonatate (TESSALON PERLES) 100 MG capsule Take 1 capsule by mouth 3 times daily as needed for Cough Yes Claudia Villarreal DO   Vitamin D (CHOLECALCIFEROL) 25 MCG (1000 UT) TABS tablet Take 1,000 Units by mouth daily Yes Historical Provider, MD   albuterol sulfate HFA (VENTOLIN HFA) 108 (90 Base) MCG/ACT inhaler Inhale 2 puffs into the lungs 4 times daily as needed (cough) Yes Halley Delacruz, APRN - CNP   furosemide (LASIX) 20 MG tablet Take 20 mg by mouth 2 times daily Yes Historical Provider, MD spironolactone (ALDACTONE) 25 MG tablet Take 1 tablet by mouth Yes Historical Provider, MD   Multiple Vitamins-Minerals (THERAPEUTIC MULTIVITAMIN-MINERALS) tablet Take 1 tablet by mouth daily Yes Historical Provider, MD   Ascorbic Acid (VITAMIN C) 500 MG tablet Take 750 mg by mouth daily Yes Historical Provider, MD   atorvastatin (LIPITOR) 40 MG tablet Take 1 tablet by mouth daily Yes Lita Tee, APRN - CNP   magnesium oxide (MAG-OX) 400 MG tablet Take 400 mg by mouth Yes Historical Provider, MD   carvedilol (COREG) 6.25 MG tablet Take 12.5 mg by mouth 2 times daily  Yes Aubrey Siddiqi, DO       Social History     Tobacco Use    Smoking status: Never Smoker    Smokeless tobacco: Never Used   Vaping Use    Vaping Use: Never used   Substance Use Topics    Alcohol use: No     Alcohol/week: 0.0 standard drinks     Comment: 1-2 beers per week.  no longer drinking     Drug use: No        Allergies   Allergen Reactions    Propofol      Other reaction(s): Confusion  Elucidations and psychotic behavior postop November 2016 mitral valve repair   ,   Past Medical History:   Diagnosis Date    Atrial fibrillation (HCC)     CHF (congestive heart failure) (HCC)     Hyperlipidemia     Obstructive sleep apnea (adult) (pediatric)     on CPAP    Shifting sleep-work schedule, affecting sleep 6/14/2017   ,   Past Surgical History:   Procedure Laterality Date    MITRAL VALVULOPLASTY  11/2016       PHYSICAL EXAMINATION:  [ INSTRUCTIONS:  \"[x]\" Indicates a positive item  \"[]\" Indicates a negative item  -- DELETE ALL ITEMS NOT EXAMINED]  Vital Signs: (As obtained by patient/caregiver or practitioner observation)    Height  -   6' 4\"          Weight -   335 lb               Constitutional: [x] Appears well-developed and well-nourished [x] No apparent distress      [] Abnormal-   Mental status  [x] Alert and awake  [x] Oriented to person/place/time [x]Able to follow commands      Eyes:  EOM    [x]  Normal  [] Abnormal-  Sclera  []  Normal  [] Abnormal -         Discharge []  None visible  [] Abnormal -    HENT:   [x] Normocephalic, atraumatic. [] Abnormal   [] Mouth/Throat: Mucous membranes are moist.     External Ears [x] Normal  [] Abnormal-     Neck: [x] No visualized mass     Pulmonary/Chest: [x] Respiratory effort normal.  [x] No visualized signs of difficulty breathing or respiratory distress        [] Abnormal-      Musculoskeletal:   [] Normal gait with no signs of ataxia         [x] Normal range of motion of neck        [] Abnormal-       Neurological:        [x] No Facial Asymmetry (Cranial nerve 7 motor function) (limited exam to video visit)          [x] No gaze palsy        [] Abnormal-         Skin:        [x] No significant exanthematous lesions or discoloration noted on facial skin         [] Abnormal-            Psychiatric:       [x] Normal Affect [] No Hallucinations        [] Abnormal-     Other pertinent observable physical exam findings-     ASSESSMENT/PLAN:  1. Productive cough  - azithromycin (ZITHROMAX Z-SHIV) 250 MG tablet; Take 1 tablet by mouth daily As directed on pack  Dispense: 1 packet; Refill: 0  - benzonatate (TESSALON PERLES) 100 MG capsule; Take 1 capsule by mouth 3 times daily as needed for Cough  Dispense: 30 capsule; Refill: 1    2. Essential hypertension  - BP elevated today  - BP BID x 2 weeks and send readings to Dr. Shaina Grimm      Return if symptoms worsen or fail to improve. Allan Mackenzie, was evaluated through a synchronous (real-time) audio-video encounter. The patient (or guardian if applicable) is aware that this is a billable service. Verbal consent to proceed has been obtained within the past 12 months. The visit was conducted pursuant to the emergency declaration under the [facility], [address] [place] [address] authority and the [employer] General Act.   Patient identification was verified, and a caregiver was present when appropriate. The patient was located in a state where the provider was credentialed to provide care. Total time spent on this encounter: Not billed by time    --Cinthya Leonard DO on 3/18/2022 at 4:35 PM    An electronic signature was used to authenticate this note.

## 2022-10-27 ENCOUNTER — OFFICE VISIT (OUTPATIENT)
Dept: PULMONOLOGY | Age: 67
End: 2022-10-27
Payer: COMMERCIAL

## 2022-10-27 VITALS
WEIGHT: 315 LBS | DIASTOLIC BLOOD PRESSURE: 90 MMHG | OXYGEN SATURATION: 96 % | BODY MASS INDEX: 38.36 KG/M2 | HEART RATE: 68 BPM | HEIGHT: 76 IN | SYSTOLIC BLOOD PRESSURE: 143 MMHG

## 2022-10-27 DIAGNOSIS — I10 ESSENTIAL HYPERTENSION: ICD-10-CM

## 2022-10-27 DIAGNOSIS — G47.33 OBSTRUCTIVE SLEEP APNEA: Primary | Chronic | ICD-10-CM

## 2022-10-27 DIAGNOSIS — I50.30 DIASTOLIC HEART FAILURE, UNSPECIFIED HF CHRONICITY (HCC): Chronic | ICD-10-CM

## 2022-10-27 DIAGNOSIS — E66.01 CLASS 3 SEVERE OBESITY DUE TO EXCESS CALORIES WITHOUT SERIOUS COMORBIDITY WITH BODY MASS INDEX (BMI) OF 40.0 TO 44.9 IN ADULT (HCC): ICD-10-CM

## 2022-10-27 DIAGNOSIS — I48.0 PAROXYSMAL ATRIAL FIBRILLATION (HCC): Chronic | ICD-10-CM

## 2022-10-27 PROCEDURE — 3078F DIAST BP <80 MM HG: CPT | Performed by: NURSE PRACTITIONER

## 2022-10-27 PROCEDURE — 1123F ACP DISCUSS/DSCN MKR DOCD: CPT | Performed by: NURSE PRACTITIONER

## 2022-10-27 PROCEDURE — 3074F SYST BP LT 130 MM HG: CPT | Performed by: NURSE PRACTITIONER

## 2022-10-27 PROCEDURE — 99214 OFFICE O/P EST MOD 30 MIN: CPT | Performed by: NURSE PRACTITIONER

## 2022-10-27 ASSESSMENT — SLEEP AND FATIGUE QUESTIONNAIRES
HOW LIKELY ARE YOU TO NOD OFF OR FALL ASLEEP WHEN YOU ARE A PASSENGER IN A CAR FOR AN HOUR WITHOUT A BREAK: 0
ESS TOTAL SCORE: 0
HOW LIKELY ARE YOU TO NOD OFF OR FALL ASLEEP WHILE SITTING AND TALKING TO SOMEONE: 0
HOW LIKELY ARE YOU TO NOD OFF OR FALL ASLEEP WHILE WATCHING TV: 0
HOW LIKELY ARE YOU TO NOD OFF OR FALL ASLEEP WHILE LYING DOWN TO REST IN THE AFTERNOON WHEN CIRCUMSTANCES PERMIT: 0
HOW LIKELY ARE YOU TO NOD OFF OR FALL ASLEEP WHILE SITTING QUIETLY AFTER LUNCH WITHOUT ALCOHOL: 0
HOW LIKELY ARE YOU TO NOD OFF OR FALL ASLEEP WHILE SITTING AND READING: 0
HOW LIKELY ARE YOU TO NOD OFF OR FALL ASLEEP WHILE SITTING INACTIVE IN A PUBLIC PLACE: 0
HOW LIKELY ARE YOU TO NOD OFF OR FALL ASLEEP IN A CAR, WHILE STOPPED FOR A FEW MINUTES IN TRAFFIC: 0

## 2022-10-27 NOTE — LETTER
[facility] Sleep Medicine  [place] [address] [place] [address]  Phone: [phone]  Fax: [phone]    October 28, 2022       Patient: Brianda Osman   MR Number: [id number]   YOB: 1955   Date of Visit: 10/27/2022       Miles Benoit was seen for a follow up visit today. Here is my assessment and plan as well as an attached copy of his visit today:    Heart failure ([address]   Chronic- Stable. Discussed the importance of treating obstructive sleep apnea as part of the management of this disorder. Cont any meds per PCP and other physicians. Atrial fibrillation (HCC)   Chronic- Stable. Discussed the importance of treating obstructive sleep apnea as part of the management of this disorder. Cont any meds per PCP and other physicians. Essential hypertension   Chronic- Stable. Discussed the importance of treating obstructive sleep apnea as part of the management of this disorder. Cont any meds per PCP and other physicians. Class 3 severe obesity without serious comorbidity with body mass index (BMI) of 40.0 to 44.9 in adult [facility])   Chronic-not stable:  Discussed importance of treating obstructive sleep apnea and getting sufficient sleep to assist with weight control. Encouraged him to work on weight loss through diet and exercise. Recommended DASH or Mediterranean diets. Obstructive sleep apnea  Chronic-Stable: Reviewed and analyzed results of physiologic download from patient's machine and reviewed with patient. Supplies and parts as needed for his machine. These are medically necessary. Limit caffeine use after 3pm. Based on the analyzed data will continue with current settings. Stable on his machine at current settings, getting benefit from the use, and having minimal side effects. Discussed replacing his mask equipment to control mask leak. Once he has a new mask and has used it for 1 week, patient to call the office to have machine data reviewed.  May need to increase pressure if AHI continues to be elevated after leak is controlled. Encouraged him to register his machine for the  recall. Verbal and written instruction on PAP equipment cleaning and disinfection schedule provided. Will see him back in 1 year. Encouraged him to contact the office with any questions or concerns. Discussed the recall of [employer] devices with patient and the severity of his sleep apnea. Discussed the risks of untreated sleep apnea including but not limited to car accidents, heart attacks, strokes, and death. Alternative therapy may not exist or may be severely limited. Felt the benefits of continued usage of these devices may outweigh the risks identified in the recall notification. Advised to avoid use of unproven cleaning methods, such as ozone. Due to the unknown variables each patient will have to make a determination in his/her own. Please contact your equipment company for further instruction until an alternative is found. Encouraged patient to register his machine for the recall and provided phone number. If you have questions or concerns, please do not hesitate to call me. I look forward to following Sadia Red along with you.     Sincerely,    EL Edgar    CC providers:  DO Truman Montague  [employer]

## 2022-10-27 NOTE — PROGRESS NOTES
[facility]  [address] [facility], [address] [facility] [phone]   [facility] Joelle Porter. [address] [place] [address]. Whti Baxter [address] [phone] SLEEP MEDICINE  [address] [place] [id number] [phone]      Assessment/Plan:      1. Obstructive sleep apnea  Assessment & Plan:  Chronic-Stable: Reviewed and analyzed results of physiologic download from patient's machine and reviewed with patient. Supplies and parts as needed for his machine. These are medically necessary. Limit caffeine use after 3pm. Based on the analyzed data will continue with current settings. Stable on his machine at current settings, getting benefit from the use, and having minimal side effects. Discussed replacing his mask equipment to control mask leak. Once he has a new mask and has used it for 1 week, patient to call the office to have machine data reviewed. May need to increase pressure if AHI continues to be elevated after leak is controlled. Encouraged him to register his machine for the  recall. Verbal and written instruction on PAP equipment cleaning and disinfection schedule provided. Will see him back in 1 year. Encouraged him to contact the office with any questions or concerns. Discussed the recall of Repironics devices with patient and the severity of his sleep apnea. Discussed the risks of untreated sleep apnea including but not limited to car accidents, heart attacks, strokes, and death. Alternative therapy may not exist or may be severely limited. Felt the benefits of continued usage of these devices may outweigh the risks identified in the recall notification. Advised to avoid use of unproven cleaning methods, such as ozone. Due to the unknown variables each patient will have to make a determination in his/her own.  Please contact your equipment company for further instruction until an alternative is found. Encouraged patient to register his machine for the recall and provided phone number. 2. Diastolic heart failure, unspecified HF chronicity (HCC)  Assessment & Plan:   Chronic- Stable. Discussed the importance of treating obstructive sleep apnea as part of the management of this disorder. Cont any meds per PCP and other physicians. 3. Paroxysmal atrial fibrillation (HCC)  Assessment & Plan:   Chronic- Stable. Discussed the importance of treating obstructive sleep apnea as part of the management of this disorder. Cont any meds per PCP and other physicians. 4. Essential hypertension  Assessment & Plan:   Chronic- Stable. Discussed the importance of treating obstructive sleep apnea as part of the management of this disorder. Cont any meds per PCP and other physicians. 5. Class 3 severe obesity due to excess calories without serious comorbidity with body mass index (BMI) of 40.0 to 44.9 in [facility])  Assessment & Plan:   Chronic-not stable:  Discussed importance of treating obstructive sleep apnea and getting sufficient sleep to assist with weight control. Encouraged him to work on weight loss through diet and exercise. Recommended DASH or Mediterranean diets. Reviewed, analyzed, and documented physiologic data from patient's PAP machine. This information was analyzed to assess complexity and medical decision making in regards to further testing and management. The primary encounter diagnosis was Obstructive sleep apnea. Diagnoses of Diastolic heart failure, unspecified HF chronicity ([address] Paroxysmal atrial fibrillation ([address] Essential hypertension, and Class 3 severe obesity due to excess calories without serious comorbidity with body mass index (BMI) of 40.0 to 44.9 in [facility]) were also pertinent to this visit. The chronic medical conditions listed are directly related to the primary diagnosis listed above.   The management of the primary diagnosis affects the secondary diagnosis and vice versa. Subjective:   Subjective   Patient ID: Airam Zendejas is a 74 y.o. male. Chief Complaint   Patient presents with    Sleep Apnea       HPI:  Machine Modem/Download Info:  Compliance (hours/night): 8.9 hrs/night  % of nights >= 4 hrs: 98.9 %  Download AHI (/hour): 5.3 /HR  Average CPAP Pressure : 12.3 cmH2O      APAP - Settings  Pressure Min: 8 cmH2O  Pressure Max: 18 cmH2O                   PAP Mask  Mask Type: Nasal mask     Florian Guadarrama reports he is doing well with his machine. The pressure on his machine is comfortable and he is waking rested. he denies headaches, congestion, nosebleeds, dryness, aerophagia, or drowsiness while driving. He has not registered his machine for the  recall. His mask is comfortable. [facility]    Dwight -  Dwight Sleepiness Score: 0    Social History     Socioeconomic History    Marital status:      Spouse name: Not on file    Number of children: Not on file    Years of education: Not on file    Highest education level: Not on file   Occupational History    Not on file   Tobacco Use    Smoking status: Never    Smokeless tobacco: Never   Vaping Use    Vaping Use: Never used   Substance and Sexual Activity    Alcohol use: No     Alcohol/week: 0.0 standard drinks     Comment: 1-2 beers per week.  no longer drinking     Drug use: No    Sexual activity: Yes     Partners: Female   Other Topics Concern    Not on file   Social History Narrative    Not on file     Social Determinants of Health     Financial Resource Strain: Low Risk     Difficulty of Paying Living Expenses: Not hard at all   Food Insecurity: No Food Insecurity    Worried About Running Out of Food in the Last Year: Never true    Ran Out of Food in the Last Year: Never true   Transportation Needs: Not on file   Physical Activity: Not on file   Stress: Not on file   Social Connections: Not on file   Intimate Partner Violence: Not on file   Housing Stability: Not on file       Current Outpatient Medications   Medication Instructions    albuterol sulfate HFA (VENTOLIN HFA) 108 (90 Base) MCG/ACT inhaler 2 puffs, Inhalation, 4 TIMES DAILY PRN    atorvastatin (LIPITOR) 40 mg, Oral, DAILY    azithromycin (ZITHROMAX Z-SHIV) 250 mg, Oral, DAILY, As directed on pack    carvedilol (COREG) 12.5 mg, Oral, 2 TIMES DAILY    furosemide (LASIX) 20 mg, Oral, 2 TIMES DAILY    magnesium oxide (MAG-OX) 400 mg, Oral    Multiple Vitamins-Minerals (THERAPEUTIC MULTIVITAMIN-MINERALS) tablet 1 tablet, Oral, DAILY    spironolactone (ALDACTONE) 25 MG tablet 1 tablet, Oral    vitamin C (ASCORBIC ACID) 750 mg, Oral, DAILY    Vitamin D (CHOLECALCIFEROL) 1,000 Units, Oral, DAILY          Vitals:  Weight BMI   Wt Readings from Last 3 Encounters:   10/27/22 (!) 337 lb (152.9 kg)   10/06/21 (!) 345 lb (156.5 kg)   12/11/20 (!) 327 lb 2.6 oz (148.4 kg)    Body mass index is 41.02 kg/m².      BP HR SaO2   BP Readings from Last 3 Encounters:   10/27/22 (!) 143/90   10/06/21 138/80   12/15/20 133/83    Pulse Readings from Last 3 Encounters:   10/27/22 68   10/06/21 75   12/15/20 64    SpO2 Readings from Last 3 Encounters:   10/27/22 96%   10/06/21 95%   12/15/20 97%        Electronically signed by EL Edgar on 10/28/2022 at 12:12 AM

## 2022-10-28 NOTE — PROGRESS NOTES
Diagnosis: [x] JOY (G47.33) [] CSA (G47.31) [] Apnea (G47.30)   Length of Need: [x] 15 Months [] 99 Months [] Other:   Machine (GERTRUDIS!): [] Respironics Dream Station      Auto [] ResMed AirSense     Auto [] Other:     []  CPAP () [] Bilevel ()   Mode: [] Auto [] Spontaneous    Mode: [] Auto [] Spontaneous            Comfort Settings:      Humidifier: [] Heated ()        [x] Water chamber replacement ()/ 1 per 6 months        Mask:   [x] Nasal () /1 per 3 months [] Full Face () /1 per 3 months   [x] Patient choice -Size and fit mask [] Patient Choice - Size and fit mask   [] Dispense: [] Dispense:   [x] Headgear () / 1 per 3 months [] Headgear () / 1 per 3 months   [x] Replacement Nasal Cushion ()/2 per month [] Interface Replacement ()/1 per month   [] Replacement Nasal Pillows ()/2 per month         Tubing: [x] Heated ()/1 per 3 months    [] Standard ()/1 per 3 months [] Other:           Filters: [x] Non-disposable ()/1 per 6 months     [x] Ultra-Fine, Disposable ()/2 per month        Miscellaneous: [] Chin Strap ()/ 1 per 6 months [] O2 bleed-in:        LPM   [] Oxymetry on CPAP/Bilevel []  Other:         Start Order Date: 10/28/22    MEDICAL JUSTIFICATION:  I, the undersigned, certify that the above prescribed supplies are medically necessary for this patients wellbeing. In my opinion, the supplies are both reasonable and necessary in reference to accepted standards of medicalpractice in treatment of this patients condition. Nalini Deutsch NP    NPI: [id number]       Order Signed Date: 10/28/22  [address] [facility]  Pulmonary, Sleep, and Critical Care    Pulmonary, Sleep, and Critical Care  [address] [place] [address].  [facility], [address] [facility] , [address] [facility]                                    Jayne Shaista [facility]  Phone: [phone]    Fax: [phone] [facility] [employer]  [address]  [address] [place] [address]/[place]   [phone] (home) [phone] (work)  [phone] (mobile)      Insurance Info (confirm with patient if correct):  Payer/Plan Subscr  Sex Relation Sub.  Ins. ID Effective Group Num

## 2022-10-28 NOTE — ASSESSMENT & PLAN NOTE
Chronic-Stable: Reviewed and analyzed results of physiologic download from patient's machine and reviewed with patient. Supplies and parts as needed for his machine. These are medically necessary. Limit caffeine use after 3pm. Based on the analyzed data will continue with current settings. Stable on his machine at current settings, getting benefit from the use, and having minimal side effects. Discussed replacing his mask equipment to control mask leak. Once he has a new mask and has used it for 1 week, patient to call the office to have machine data reviewed. May need to increase pressure if AHI continues to be elevated after leak is controlled. Encouraged him to register his machine for the  recall. Verbal and written instruction on PAP equipment cleaning and disinfection schedule provided. Will see him back in 1 year. Encouraged him to contact the office with any questions or concerns. Discussed the recall of Repironics devices with patient and the severity of his sleep apnea. Discussed the risks of untreated sleep apnea including but not limited to car accidents, heart attacks, strokes, and death. Alternative therapy may not exist or may be severely limited. Felt the benefits of continued usage of these devices may outweigh the risks identified in the recall notification. Advised to avoid use of unproven cleaning methods, such as ozone. Due to the unknown variables each patient will have to make a determination in his/her own. Please contact your equipment company for further instruction until an alternative is found. Encouraged patient to register his machine for the recall and provided phone number.

## 2022-12-06 SDOH — HEALTH STABILITY: PHYSICAL HEALTH: ON AVERAGE, HOW MANY MINUTES DO YOU ENGAGE IN EXERCISE AT THIS LEVEL?: 20 MIN

## 2022-12-06 SDOH — HEALTH STABILITY: PHYSICAL HEALTH: ON AVERAGE, HOW MANY DAYS PER WEEK DO YOU ENGAGE IN MODERATE TO STRENUOUS EXERCISE (LIKE A BRISK WALK)?: 2 DAYS

## 2022-12-06 ASSESSMENT — LIFESTYLE VARIABLES
HOW OFTEN DURING THE LAST YEAR HAVE YOU FAILED TO DO WHAT WAS NORMALLY EXPECTED FROM YOU BECAUSE OF DRINKING: 0
HOW MANY STANDARD DRINKS CONTAINING ALCOHOL DO YOU HAVE ON A TYPICAL DAY: 3 OR 4
HOW OFTEN DO YOU HAVE A DRINK CONTAINING ALCOHOL: 4
HAVE YOU OR SOMEONE ELSE BEEN INJURED AS A RESULT OF YOUR DRINKING: 0
HOW OFTEN DURING THE LAST YEAR HAVE YOU NEEDED AN ALCOHOLIC DRINK FIRST THING IN THE MORNING TO GET YOURSELF GOING AFTER A NIGHT OF HEAVY DRINKING: NEVER
HAVE YOU OR SOMEONE ELSE BEEN INJURED AS A RESULT OF YOUR DRINKING: NO
HOW OFTEN DURING THE LAST YEAR HAVE YOU FOUND THAT YOU WERE NOT ABLE TO STOP DRINKING ONCE YOU HAD STARTED: 0
HOW OFTEN DURING THE LAST YEAR HAVE YOU BEEN UNABLE TO REMEMBER WHAT HAPPENED THE NIGHT BEFORE BECAUSE YOU HAD BEEN DRINKING: NEVER
HOW OFTEN DURING THE LAST YEAR HAVE YOU NEEDED AN ALCOHOLIC DRINK FIRST THING IN THE MORNING TO GET YOURSELF GOING AFTER A NIGHT OF HEAVY DRINKING: 0
HAS A RELATIVE, FRIEND, DOCTOR, OR ANOTHER HEALTH PROFESSIONAL EXPRESSED CONCERN ABOUT YOUR DRINKING OR SUGGESTED YOU CUT DOWN: 0
HOW OFTEN DURING THE LAST YEAR HAVE YOU FOUND THAT YOU WERE NOT ABLE TO STOP DRINKING ONCE YOU HAD STARTED: NEVER
HOW OFTEN DURING THE LAST YEAR HAVE YOU HAD A FEELING OF GUILT OR REMORSE AFTER DRINKING: 0
HOW MANY STANDARD DRINKS CONTAINING ALCOHOL DO YOU HAVE ON A TYPICAL DAY: 2
HOW OFTEN DURING THE LAST YEAR HAVE YOU FAILED TO DO WHAT WAS NORMALLY EXPECTED FROM YOU BECAUSE OF DRINKING: NEVER
HOW OFTEN DURING THE LAST YEAR HAVE YOU BEEN UNABLE TO REMEMBER WHAT HAPPENED THE NIGHT BEFORE BECAUSE YOU HAD BEEN DRINKING: 0
HAS A RELATIVE, FRIEND, DOCTOR, OR ANOTHER HEALTH PROFESSIONAL EXPRESSED CONCERN ABOUT YOUR DRINKING OR SUGGESTED YOU CUT DOWN: NO
HOW OFTEN DURING THE LAST YEAR HAVE YOU HAD A FEELING OF GUILT OR REMORSE AFTER DRINKING: NEVER
HOW OFTEN DO YOU HAVE A DRINK CONTAINING ALCOHOL: 2-3 TIMES A WEEK
HOW OFTEN DO YOU HAVE SIX OR MORE DRINKS ON ONE OCCASION: 2

## 2022-12-06 ASSESSMENT — PATIENT HEALTH QUESTIONNAIRE - PHQ9
SUM OF ALL RESPONSES TO PHQ QUESTIONS 1-9: 1
1. LITTLE INTEREST OR PLEASURE IN DOING THINGS: 1
SUM OF ALL RESPONSES TO PHQ9 QUESTIONS 1 & 2: 1
2. FEELING DOWN, DEPRESSED OR HOPELESS: 0

## 2022-12-07 ENCOUNTER — OFFICE VISIT (OUTPATIENT)
Dept: FAMILY MEDICINE CLINIC | Age: 67
End: 2022-12-07

## 2022-12-07 VITALS
WEIGHT: 315 LBS | DIASTOLIC BLOOD PRESSURE: 80 MMHG | TEMPERATURE: 97.2 F | SYSTOLIC BLOOD PRESSURE: 136 MMHG | RESPIRATION RATE: 16 BRPM | HEART RATE: 74 BPM | HEIGHT: 75 IN | BODY MASS INDEX: 39.17 KG/M2 | OXYGEN SATURATION: 97 %

## 2022-12-07 DIAGNOSIS — Z12.11 COLON CANCER SCREENING: ICD-10-CM

## 2022-12-07 DIAGNOSIS — Z23 NEED FOR PROPHYLACTIC VACCINATION AND INOCULATION AGAINST VARICELLA: ICD-10-CM

## 2022-12-07 DIAGNOSIS — Z00.00 INITIAL MEDICARE ANNUAL WELLNESS VISIT: Primary | ICD-10-CM

## 2022-12-07 DIAGNOSIS — I10 ESSENTIAL HYPERTENSION: ICD-10-CM

## 2022-12-07 DIAGNOSIS — R53.83 FATIGUE, UNSPECIFIED TYPE: ICD-10-CM

## 2022-12-07 DIAGNOSIS — Z00.00 ANNUAL PHYSICAL EXAM: ICD-10-CM

## 2022-12-07 DIAGNOSIS — Z80.42 FAMILY HX OF PROSTATE CANCER: ICD-10-CM

## 2022-12-07 DIAGNOSIS — Z23 NEEDS FLU SHOT: ICD-10-CM

## 2022-12-07 NOTE — PATIENT INSTRUCTIONS
Get the fasting labs in the next few days. Continue the current medication and cardiologist follow up       Substance Use Disorder: Care Instructions  Overview     You can improve your life and health by stopping your use of alcohol or drugs. When you don't drink or use drugs, you may feel and sleep better. You may get along better with your family, friends, and coworkers. There are medicines and programs that can help with substance use disorder. How can you care for yourself at home? If you have been given medicine to help keep you sober or reduce your cravings, be sure to take it exactly as prescribed. Talk to your doctor about programs that can help you stop using drugs or drinking alcohol. Do not tempt yourself by keeping alcohol or drugs in your home. Learn how to say no when other people drink or use drugs. Or don't spend time with people who drink or use drugs. Use the time and money spent on drinking or drugs to do something fun with your family or friends. Preventing a relapse  Do not drink alcohol or use drugs at all. Using any amount of alcohol or drugs greatly increases your risk for relapse. Seek help from organizations such as Alcoholics Anonymous, Narcotics Anonymous, or treatment facilities if you feel the need to drink alcohol or use drugs again. Remember that recovery is a lifelong process. Stay away from situations, friends, or places that may lead you to drink or use drugs. Have a plan to spot and deal with relapse. Learn to recognize changes in your thinking that lead you to drink or use drugs. These are warning signs. Get help before you start to drink or use drugs again. Get help as soon as you can if you relapse. Some people make a plan with another person that outlines what they want that person to do for them if they relapse. The plan usually includes how to handle the relapse and who to notify in case of relapse. Don't give up.  Remember that a relapse does not mean that you have failed. Use the experience to learn the triggers that lead you to drink or use drugs. Then quit again. Many people have several relapses before they are able to quit for good. Follow-up care is a key part of your treatment and safety. Be sure to make and go to all appointments, and call your doctor if you are having problems. It's also a good idea to know your test results and keep a list of the medicines you take. When should you call for help? Call 911  anytime you think you may need emergency care. For example, call if:    You feel you cannot stop from hurting yourself or someone else. Call your doctor now or seek immediate medical care if:    You have serious withdrawal symptoms, such as confusion, hallucinations, severe trembling, or seizures. Watch closely for changes in your health, and be sure to contact your doctor if:    You have a relapse.     You need more help or support to stop. Where can you learn more? Go to http://www.lainez.com/ and enter H573 to learn more about \"Substance Use Disorder: Care Instructions. \"  Current as of: August 2, 2022               Content Version: 13.5  © 0115-9532 Healthwise, Incorporated. Care instructions adapted under license by [facility] ([facility]). If you have questions about a medical condition or this instruction, always ask your healthcare professional. [address] any warranty or liability for your use of this information. Fatigue: Care Instructions  Your Care Instructions     Fatigue is a feeling of tiredness, exhaustion, or lack of energy. You may feel fatigue because of too much or not enough activity. It can also come from stress, lack of sleep, boredom, and poor diet. Many medical problems, such as viral infections, can cause fatigue. Emotional problems, especially depression, are often the cause of fatigue. Fatigue is most often a symptom of another problem. Treatment for fatigue depends on the cause. For example, if you have fatigue because you have a certain health problem, treating this problem also treats your fatigue. If depression or anxiety is the cause, treatment may help. Follow-up care is a key part of your treatment and safety. Be sure to make and go to all appointments, and call your doctor if you are having problems. It's also a good idea to know your test results and keep a list of the medicines you take. How can you care for yourself at home? Get regular exercise. But don't overdo it. Go back and forth between rest and exercise. Get plenty of rest.  Eat a healthy diet. Do not skip meals, especially breakfast.  Reduce your use of caffeine, tobacco, and alcohol. Caffeine is most often found in coffee, tea, cola drinks, and chocolate. Limit medicines that can cause fatigue. This includes tranquilizers and cold and allergy medicines. When should you call for help? Watch closely for changes in your health, and be sure to contact your doctor if:    You have new symptoms such as fever or a rash.     Your fatigue gets worse.     You have been feeling down, depressed, or hopeless. Or you may have lost interest in things that you usually enjoy.     You are not getting better as expected. Where can you learn more? Go to http://www.woods.com/ and enter P235 to learn more about \"Fatigue: Care Instructions. \"  Current as of: February 9, 2022               Content Version: 13.5  © 2006-2022 Healthwise, Incorporated. Care instructions adapted under license by [facility] ([facility]). If you have questions about a medical condition or this instruction, always ask your healthcare professional. [place] [address] any warranty or liability for your use of this information. Learning About Vision Tests  What are vision tests? The four most common vision tests are visual acuity tests, refraction, visual field tests, and color vision tests.   Visual acuity (sharpness) tests  These tests are used: To see if you need glasses or contact lenses. To monitor an eye problem. To check an eye injury. Visual acuity tests are done as part of routine exams. You may also have this test when you get your 's license or apply for some types of jobs. Visual field tests  These tests are used: To check for vision loss in any area of your range of vision. To screen for certain eye diseases. To look for nerve damage after a stroke, head injury, or other problem that could reduce blood flow to the brain. Refraction and color tests  A refraction test is done to find the right prescription for glasses and contact lenses. A color vision test is done to check for color blindness. Color vision is often tested as part of a routine exam. You may also have this test when you apply for a job where recognizing different colors is important, such as , electronics, or the [employer]. How are vision tests done? Visual acuity test   You cover one eye at a time. You read aloud from a wall chart across the room. You read aloud from a small card that you hold in your hand. Refraction   You look into a special device. The device puts lenses of different strengths in front of each eye to see how strong your glasses or contact lenses need to be. Visual field tests   Your doctor may have you look through special machines. Or your doctor may simply have you stare straight ahead while they move a finger into and out of your field of vision. Color vision test   You look at pieces of printed test patterns in various colors. You say what number or symbol you see. Your doctor may have you trace the number or symbol using a pointer. How do these tests feel? There is very little chance of having a problem from this test. If dilating drops are used for a vision test, they may make the eyes sting and cause a medicine taste in the mouth.   Follow-up care is a key part of your treatment and safety. Be sure to make and go to all appointments, and call your doctor if you are having problems. It's also a good idea to know your test results and keep a list of the medicines you take. Where can you learn more? Go to http://www.lainez.com/ and enter G551 to learn more about \"Learning About Vision Tests. \"  Current as of: October 12, 2022               Content Version: 13.5  © 2006-2022 [employer]. Care instructions adapted under license by [facility] ([facility]). If you have questions about a medical condition or this instruction, always ask your healthcare professional. [place] [address] any warranty or liability for your use of this information. Advance Directives: Care Instructions  Overview  An advance directive is a legal way to state your wishes at the end of your life. It tells your family and your doctor what to do if you can't say what you want. There are two main types of advance directives. You can change them any time your wishes change. Living will. This form tells your family and your doctor your wishes about life support and other treatment. The form is also called a declaration. Medical power of . This form lets you name a person to make treatment decisions for you when you can't speak for yourself. This person is called a health care agent (health care proxy, health care surrogate). The form is also called a durable power of  for health care. If you do not have an advance directive, decisions about your medical care may be made by a family member, or by a doctor or a  who doesn't know you. It may help to think of an advance directive as a gift to the people who care for you. If you have one, they won't have to make tough decisions by themselves. For more information, including forms for your state, see the [address] website (www.caringinfo.org/planning/advance-directives/).   Follow-up care is a key part of your treatment and safety. Be sure to make and go to all appointments, and call your doctor if you are having problems. It's also a good idea to know your test results and keep a list of the medicines you take. What should you include in an advance directive? Many states have a unique advance directive form. (It may ask you to address specific issues.) Or you might use a universal form that's approved by many states. If your form doesn't tell you what to address, it may be hard to know what to include in your advance directive. Use the questions below to help you get started. Who do you want to make decisions about your medical care if you are not able to? What life-support measures do you want if you have a serious illness that gets worse over time or can't be cured? What are you most afraid of that might happen? (Maybe you're afraid of having pain, losing your independence, or being kept alive by machines.)  Where would you prefer to die? (Your home? A hospital? A nursing home?)  Do you want to donate your organs when you die? Do you want certain Orthodox practices performed before you die? When should you call for help? Be sure to contact your doctor if you have any questions. Where can you learn more? Go to http://www.lainez.com/ and enter R264 to learn more about \"Advance Directives: Care Instructions. \"  Current as of: June 16, 2022               Content Version: 13.5  © 4210-5639 Healthwise, Incorporated. Care instructions adapted under license by [facility] ([facility]). If you have questions about a medical condition or this instruction, always ask your healthcare professional. [place] [address] any warranty or liability for your use of this information. Personalized Preventive Plan for Beti Davidson - 12/7/2022  Medicare offers a range of preventive health benefits.  Some of the tests and screenings are paid in full while other may be subject to a deductible, co-insurance, and/or copay. Some of these benefits include a comprehensive review of your medical history including lifestyle, illnesses that may run in your family, and various assessments and screenings as appropriate. After reviewing your medical record and screening and assessments performed today your provider may have ordered immunizations, labs, imaging, and/or referrals for you. A list of these orders (if applicable) as well as your Preventive Care list are included within your After Visit Summary for your review. Other Preventive Recommendations:    A preventive eye exam performed by an eye specialist is recommended every 1-2 years to screen for glaucoma; cataracts, macular degeneration, and other eye disorders. A preventive dental visit is recommended every 6 months. Try to get at least 150 minutes of exercise per week or 10,000 steps per day on a pedometer . Order or download the FREE \"Exercise & Physical Activity: Your Everyday Guide\" from [employer]. Call 2-146.376.9342 or search [employer] online. You need 8700-3973 mg of calcium and 5678-6101 IU of vitamin D per day. It is possible to meet your calcium requirement with diet alone, but a vitamin D supplement is usually necessary to meet this goal.  When exposed to the sun, use a sunscreen that protects against both UVA and UVB radiation with an SPF of 30 or greater. Reapply every 2 to 3 hours or after sweating, drying off with a towel, or swimming. Always wear a seat belt when traveling in a car. Always wear a helmet when riding a bicycle or motorcycle.

## 2022-12-07 NOTE — PROGRESS NOTES
Subjective:      Patient ID: Avinash Ornelas is a 69 y.o. y.o. male. Here for wellness. Retired. Covid Dec 2020 and says fatigued still. Sense of smell is still altered. Follows with cardiology for his heart valve. .  stable  HPI      Chief Complaint   Patient presents with    Medicare AWV     Not fasting today       Allergies   Allergen Reactions    Propofol      Other reaction(s): Confusion  Elucidations and psychotic behavior postop November 2016 mitral valve repair       Past Medical History:   Diagnosis Date    Atrial fibrillation (HCC)     CHF (congestive heart failure) (HCC)     Hyperlipidemia     Obstructive sleep apnea (adult) (pediatric)     on CPAP    Shifting sleep-work schedule, affecting sleep 6/14/2017       Past Surgical History:   Procedure Laterality Date    MITRAL VALVULOPLASTY  11/2016       Social History     Socioeconomic History    Marital status:      Spouse name: Not on file    Number of children: Not on file    Years of education: Not on file    Highest education level: Not on file   Occupational History    Not on file   Tobacco Use    Smoking status: Never    Smokeless tobacco: Never   Vaping Use    Vaping Use: Never used   Substance and Sexual Activity    Alcohol use: No     Comment: 1-2 beers per week.  no longer drinking     Drug use: No    Sexual activity: Not Currently     Partners: Female   Other Topics Concern    Not on file   Social History Narrative    Not on file     Social Determinants of Health     Financial Resource Strain: Low Risk     Difficulty of Paying Living Expenses: Not hard at all   Food Insecurity: No Food Insecurity    Worried About Running Out of Food in the Last Year: Never true    Ran Out of Food in the Last Year: Never true   Transportation Needs: Not on file   Physical Activity: Insufficiently Active    Days of Exercise per Week: 2 days    Minutes of Exercise per Session: 20 min   Stress: Not on file   Social Connections: Not on file   Intimate Partner Violence: Not on file   Housing Stability: Not on file       Family History   Problem Relation Age of Onset    Other Father         JOY    Prostate Cancer Father        Vitals:    12/07/22 1328   BP: 136/80   Pulse: 74   Resp: 16   Temp: 97.2 °F (36.2 °C)   SpO2: 97%       Wt Readings from Last 3 Encounters:   12/07/22 (!) 342 lb (155.1 kg)   10/27/22 (!) 337 lb (152.9 kg)   10/06/21 (!) 345 lb (156.5 kg)       Review of Systems    Objective:   Physical Exam    Assessment:       Diagnosis Orders   1. Needs flu shot        2. Need for prophylactic vaccination and inoculation against varicella        3. Colon cancer screening        Hypertension  Mitral stenosis  Obesity      Plan:     Due / wants some lab and work up. General discussion   Cologuard discussed. General inst      Current Outpatient Medications   Medication Sig Dispense Refill    Vitamin D (CHOLECALCIFEROL) 25 MCG (1000 UT) TABS tablet Take 1,000 Units by mouth daily      furosemide (LASIX) 20 MG tablet Take 20 mg by mouth 2 times daily      spironolactone (ALDACTONE) 25 MG tablet Take 1 tablet by mouth      Multiple Vitamins-Minerals (THERAPEUTIC MULTIVITAMIN-MINERALS) tablet Take 1 tablet by mouth daily      Ascorbic Acid (VITAMIN C) 500 MG tablet Take 750 mg by mouth daily      atorvastatin (LIPITOR) 40 MG tablet Take 1 tablet by mouth daily 30 tablet 2    magnesium oxide (MAG-OX) 400 MG tablet Take 400 mg by mouth      carvedilol (COREG) 6.25 MG tablet Take 12.5 mg by mouth 2 times daily  60 tablet      No current facility-administered medications for this visit. Discussed Medicare wellness parameters. Yearly oral and dental exam, yearly eye exam, advanced directive with \"living will\" discussed. Health maintenance and guidance and surveillance discussed. Recommend continue following with cardiology for his heart valve.   Appears generally stable but has not had routine labs for cholesterol or diabetes or kidney function and so we will obtain this today.

## 2022-12-08 DIAGNOSIS — I10 ESSENTIAL HYPERTENSION: ICD-10-CM

## 2022-12-08 DIAGNOSIS — R53.83 FATIGUE, UNSPECIFIED TYPE: ICD-10-CM

## 2022-12-08 DIAGNOSIS — Z00.00 ANNUAL PHYSICAL EXAM: ICD-10-CM

## 2022-12-08 DIAGNOSIS — Z80.42 FAMILY HX OF PROSTATE CANCER: ICD-10-CM

## 2022-12-08 LAB
A/G RATIO: 1.6 (ref 1.1–2.2)
ALBUMIN SERPL-MCNC: 4.2 G/DL (ref 3.4–5)
ALP BLD-CCNC: 92 U/L (ref 40–129)
ALT SERPL-CCNC: 30 U/L (ref 10–40)
ANION GAP SERPL CALCULATED.3IONS-SCNC: 17 MMOL/L (ref 3–16)
AST SERPL-CCNC: 17 U/L (ref 15–37)
BASOPHILS ABSOLUTE: 0.1 K/UL (ref 0–0.2)
BASOPHILS RELATIVE PERCENT: 0.8 %
BILIRUB SERPL-MCNC: 0.8 MG/DL (ref 0–1)
BUN BLDV-MCNC: 15 MG/DL (ref 7–20)
CALCIUM SERPL-MCNC: 9.2 MG/DL (ref 8.3–10.6)
CHLORIDE BLD-SCNC: 103 MMOL/L (ref 99–110)
CHOLESTEROL, TOTAL: 178 MG/DL (ref 0–199)
CO2: 21 MMOL/L (ref 21–32)
CREAT SERPL-MCNC: 0.9 MG/DL (ref 0.8–1.3)
EOSINOPHILS ABSOLUTE: 0.1 K/UL (ref 0–0.6)
EOSINOPHILS RELATIVE PERCENT: 1.4 %
GFR SERPL CREATININE-BSD FRML MDRD: >60 ML/MIN/{1.73_M2}
GLUCOSE BLD-MCNC: 98 MG/DL (ref 70–99)
HCT VFR BLD CALC: 49.6 % (ref 40.5–52.5)
HDLC SERPL-MCNC: 35 MG/DL (ref 40–60)
HEMOGLOBIN: 16.2 G/DL (ref 13.5–17.5)
LDL CHOLESTEROL CALCULATED: 113 MG/DL
LYMPHOCYTES ABSOLUTE: 1.6 K/UL (ref 1–5.1)
LYMPHOCYTES RELATIVE PERCENT: 20.2 %
MCH RBC QN AUTO: 30 PG (ref 26–34)
MCHC RBC AUTO-ENTMCNC: 32.6 G/DL (ref 31–36)
MCV RBC AUTO: 91.9 FL (ref 80–100)
MONOCYTES ABSOLUTE: 0.6 K/UL (ref 0–1.3)
MONOCYTES RELATIVE PERCENT: 7.7 %
NEUTROPHILS ABSOLUTE: 5.6 K/UL (ref 1.7–7.7)
NEUTROPHILS RELATIVE PERCENT: 69.9 %
PDW BLD-RTO: 12.8 % (ref 12.4–15.4)
PLATELET # BLD: 199 K/UL (ref 135–450)
PMV BLD AUTO: 10.7 FL (ref 5–10.5)
POTASSIUM SERPL-SCNC: 4.8 MMOL/L (ref 3.5–5.1)
PROSTATE SPECIFIC ANTIGEN: 0.54 NG/ML (ref 0–4)
RBC # BLD: 5.39 M/UL (ref 4.2–5.9)
SODIUM BLD-SCNC: 141 MMOL/L (ref 136–145)
TOTAL PROTEIN: 6.8 G/DL (ref 6.4–8.2)
TRIGL SERPL-MCNC: 151 MG/DL (ref 0–150)
TSH REFLEX: 1.88 UIU/ML (ref 0.27–4.2)
VLDLC SERPL CALC-MCNC: 30 MG/DL
WBC # BLD: 8 K/UL (ref 4–11)

## 2023-11-20 ENCOUNTER — TELEPHONE (OUTPATIENT)
Dept: FAMILY MEDICINE CLINIC | Age: 68
End: 2023-11-20

## 2023-11-20 NOTE — TELEPHONE ENCOUNTER
Called & spoke to pt  He had a positive cologuard 12/15/22  He declined a referral still - \"states not at this time\"

## 2024-05-06 ENCOUNTER — TELEPHONE (OUTPATIENT)
Dept: PULMONOLOGY | Age: 69
End: 2024-05-06

## 2024-05-06 NOTE — TELEPHONE ENCOUNTER
Patient moved to the [facility] and was needing the number for the recall from [facility].  Gave patient the number.  He asked if we knew of any providers in that area that he could transfer to.  I told him we would call if we knew of someone, but that he would most likely want to check with his insurance to see who they are contracted with.